# Patient Record
Sex: MALE | Race: BLACK OR AFRICAN AMERICAN | Employment: UNEMPLOYED | URBAN - METROPOLITAN AREA
[De-identification: names, ages, dates, MRNs, and addresses within clinical notes are randomized per-mention and may not be internally consistent; named-entity substitution may affect disease eponyms.]

---

## 2020-04-10 ENCOUNTER — APPOINTMENT (OUTPATIENT)
Dept: GENERAL RADIOLOGY | Age: 80
DRG: 177 | End: 2020-04-10
Payer: OTHER GOVERNMENT

## 2020-04-10 ENCOUNTER — HOSPITAL ENCOUNTER (INPATIENT)
Age: 80
LOS: 10 days | Discharge: HOME | DRG: 177 | End: 2020-04-20
Attending: STUDENT IN AN ORGANIZED HEALTH CARE EDUCATION/TRAINING PROGRAM | Admitting: INTERNAL MEDICINE
Payer: OTHER GOVERNMENT

## 2020-04-10 PROBLEM — Z20.822 SUSPECTED 2019 NOVEL CORONAVIRUS INFECTION: Status: ACTIVE | Noted: 2020-04-10

## 2020-04-10 PROBLEM — E87.1 HYPONATREMIA: Status: ACTIVE | Noted: 2020-04-10

## 2020-04-10 PROBLEM — J18.9 MULTIFOCAL PNEUMONIA: Status: ACTIVE | Noted: 2020-04-10

## 2020-04-10 PROBLEM — Z95.0 PACEMAKER: Status: ACTIVE | Noted: 2020-04-10

## 2020-04-10 LAB
A/G RATIO: 1 (ref 1.1–2.2)
ALBUMIN SERPL-MCNC: 3.9 G/DL (ref 3.4–5)
ALP BLD-CCNC: 46 U/L (ref 40–129)
ALT SERPL-CCNC: 17 U/L (ref 10–40)
ANION GAP SERPL CALCULATED.3IONS-SCNC: 14 MMOL/L (ref 3–16)
AST SERPL-CCNC: 33 U/L (ref 15–37)
BASOPHILS ABSOLUTE: 0 K/UL (ref 0–0.2)
BASOPHILS RELATIVE PERCENT: 0.4 %
BILIRUB SERPL-MCNC: 0.4 MG/DL (ref 0–1)
BUN BLDV-MCNC: 17 MG/DL (ref 7–20)
C-REACTIVE PROTEIN: 91.9 MG/L (ref 0–5.1)
CALCIUM SERPL-MCNC: 8.8 MG/DL (ref 8.3–10.6)
CHLORIDE BLD-SCNC: 93 MMOL/L (ref 99–110)
CO2: 23 MMOL/L (ref 21–32)
CREAT SERPL-MCNC: 0.9 MG/DL (ref 0.8–1.3)
D DIMER: 427 NG/ML DDU (ref 0–229)
EOSINOPHILS ABSOLUTE: 0 K/UL (ref 0–0.6)
EOSINOPHILS RELATIVE PERCENT: 0 %
GFR AFRICAN AMERICAN: >60
GFR NON-AFRICAN AMERICAN: >60
GLOBULIN: 4.1 G/DL
GLUCOSE BLD-MCNC: 126 MG/DL (ref 70–99)
HCT VFR BLD CALC: 36 % (ref 40.5–52.5)
HEMOGLOBIN: 12.2 G/DL (ref 13.5–17.5)
INR BLD: 1.24 (ref 0.86–1.14)
LACTATE DEHYDROGENASE: 308 U/L (ref 100–190)
LACTIC ACID, SEPSIS: 1.6 MMOL/L (ref 0.4–1.9)
LYMPHOCYTES ABSOLUTE: 0.7 K/UL (ref 1–5.1)
LYMPHOCYTES RELATIVE PERCENT: 14.9 %
MCH RBC QN AUTO: 31.2 PG (ref 26–34)
MCHC RBC AUTO-ENTMCNC: 33.9 G/DL (ref 31–36)
MCV RBC AUTO: 92 FL (ref 80–100)
MONOCYTES ABSOLUTE: 0.2 K/UL (ref 0–1.3)
MONOCYTES RELATIVE PERCENT: 5.1 %
NEUTROPHILS ABSOLUTE: 3.6 K/UL (ref 1.7–7.7)
NEUTROPHILS RELATIVE PERCENT: 79.6 %
PDW BLD-RTO: 14.9 % (ref 12.4–15.4)
PLATELET # BLD: 141 K/UL (ref 135–450)
PMV BLD AUTO: 8.5 FL (ref 5–10.5)
POTASSIUM REFLEX MAGNESIUM: 4.3 MMOL/L (ref 3.5–5.1)
PRO-BNP: 328 PG/ML (ref 0–449)
PROCALCITONIN: 0.27 NG/ML (ref 0–0.15)
PROTHROMBIN TIME: 14.4 SEC (ref 10–13.2)
RAPID INFLUENZA  B AGN: NEGATIVE
RAPID INFLUENZA A AGN: NEGATIVE
RBC # BLD: 3.91 M/UL (ref 4.2–5.9)
SARS-COV-2, PCR: DETECTED
SODIUM BLD-SCNC: 130 MMOL/L (ref 136–145)
TOTAL PROTEIN: 8 G/DL (ref 6.4–8.2)
TROPONIN: <0.01 NG/ML
WBC # BLD: 4.5 K/UL (ref 4–11)

## 2020-04-10 PROCEDURE — 87804 INFLUENZA ASSAY W/OPTIC: CPT

## 2020-04-10 PROCEDURE — 83615 LACTATE (LD) (LDH) ENZYME: CPT

## 2020-04-10 PROCEDURE — 6370000000 HC RX 637 (ALT 250 FOR IP): Performed by: NURSE PRACTITIONER

## 2020-04-10 PROCEDURE — 87040 BLOOD CULTURE FOR BACTERIA: CPT

## 2020-04-10 PROCEDURE — 6370000000 HC RX 637 (ALT 250 FOR IP): Performed by: INTERNAL MEDICINE

## 2020-04-10 PROCEDURE — 93005 ELECTROCARDIOGRAM TRACING: CPT | Performed by: NURSE PRACTITIONER

## 2020-04-10 PROCEDURE — 84484 ASSAY OF TROPONIN QUANT: CPT

## 2020-04-10 PROCEDURE — 2580000003 HC RX 258: Performed by: INTERNAL MEDICINE

## 2020-04-10 PROCEDURE — 83880 ASSAY OF NATRIURETIC PEPTIDE: CPT

## 2020-04-10 PROCEDURE — 83605 ASSAY OF LACTIC ACID: CPT

## 2020-04-10 PROCEDURE — 99285 EMERGENCY DEPT VISIT HI MDM: CPT

## 2020-04-10 PROCEDURE — 80053 COMPREHEN METABOLIC PANEL: CPT

## 2020-04-10 PROCEDURE — 85610 PROTHROMBIN TIME: CPT

## 2020-04-10 PROCEDURE — 86140 C-REACTIVE PROTEIN: CPT

## 2020-04-10 PROCEDURE — 85025 COMPLETE CBC W/AUTO DIFF WBC: CPT

## 2020-04-10 PROCEDURE — 71045 X-RAY EXAM CHEST 1 VIEW: CPT

## 2020-04-10 PROCEDURE — 85379 FIBRIN DEGRADATION QUANT: CPT

## 2020-04-10 PROCEDURE — 84145 PROCALCITONIN (PCT): CPT

## 2020-04-10 PROCEDURE — 2580000003 HC RX 258: Performed by: NURSE PRACTITIONER

## 2020-04-10 PROCEDURE — 36415 COLL VENOUS BLD VENIPUNCTURE: CPT

## 2020-04-10 PROCEDURE — U0002 COVID-19 LAB TEST NON-CDC: HCPCS

## 2020-04-10 PROCEDURE — 2060000000 HC ICU INTERMEDIATE R&B

## 2020-04-10 RX ORDER — TAMSULOSIN HYDROCHLORIDE 0.4 MG/1
0.4 CAPSULE ORAL DAILY
COMMUNITY

## 2020-04-10 RX ORDER — SODIUM CHLORIDE 0.9 % (FLUSH) 0.9 %
10 SYRINGE (ML) INJECTION PRN
Status: DISCONTINUED | OUTPATIENT
Start: 2020-04-10 | End: 2020-04-14 | Stop reason: SDUPTHER

## 2020-04-10 RX ORDER — SODIUM CHLORIDE 0.9 % (FLUSH) 0.9 %
10 SYRINGE (ML) INJECTION EVERY 12 HOURS SCHEDULED
Status: DISCONTINUED | OUTPATIENT
Start: 2020-04-10 | End: 2020-04-14 | Stop reason: SDUPTHER

## 2020-04-10 RX ORDER — AZITHROMYCIN 250 MG/1
250 TABLET, FILM COATED ORAL SEE ADMIN INSTRUCTIONS
Status: ON HOLD | COMMUNITY
End: 2020-04-17 | Stop reason: HOSPADM

## 2020-04-10 RX ORDER — POLYVINYL ALCOHOL 14 MG/ML
1 SOLUTION/ DROPS OPHTHALMIC PRN
Status: DISCONTINUED | OUTPATIENT
Start: 2020-04-10 | End: 2020-04-20 | Stop reason: HOSPADM

## 2020-04-10 RX ORDER — AMLODIPINE BESYLATE 10 MG/1
10 TABLET ORAL DAILY
Status: DISCONTINUED | OUTPATIENT
Start: 2020-04-11 | End: 2020-04-12

## 2020-04-10 RX ORDER — AMOXICILLIN 500 MG/1
1000 CAPSULE ORAL 3 TIMES DAILY
Status: ON HOLD | COMMUNITY
Start: 2020-04-09 | End: 2020-04-17 | Stop reason: HOSPADM

## 2020-04-10 RX ORDER — METOPROLOL SUCCINATE 50 MG/1
50 TABLET, EXTENDED RELEASE ORAL DAILY
Status: DISCONTINUED | OUTPATIENT
Start: 2020-04-11 | End: 2020-04-20 | Stop reason: HOSPADM

## 2020-04-10 RX ORDER — METOPROLOL SUCCINATE 50 MG/1
50 TABLET, EXTENDED RELEASE ORAL DAILY
COMMUNITY

## 2020-04-10 RX ORDER — OLOPATADINE HYDROCHLORIDE 1 MG/ML
1 SOLUTION/ DROPS OPHTHALMIC 2 TIMES DAILY PRN
COMMUNITY

## 2020-04-10 RX ORDER — IBUPROFEN 600 MG/1
600 TABLET ORAL EVERY 6 HOURS PRN
COMMUNITY

## 2020-04-10 RX ORDER — POLYETHYLENE GLYCOL 3350 17 G/17G
17 POWDER, FOR SOLUTION ORAL DAILY PRN
Status: DISCONTINUED | OUTPATIENT
Start: 2020-04-10 | End: 2020-04-20 | Stop reason: HOSPADM

## 2020-04-10 RX ORDER — AMLODIPINE BESYLATE 10 MG/1
10 TABLET ORAL DAILY
COMMUNITY

## 2020-04-10 RX ORDER — LORATADINE 10 MG/1
10 TABLET ORAL DAILY
COMMUNITY

## 2020-04-10 RX ORDER — PROMETHAZINE HYDROCHLORIDE 25 MG/1
12.5 TABLET ORAL EVERY 6 HOURS PRN
Status: DISCONTINUED | OUTPATIENT
Start: 2020-04-10 | End: 2020-04-20 | Stop reason: HOSPADM

## 2020-04-10 RX ORDER — ONDANSETRON 2 MG/ML
4 INJECTION INTRAMUSCULAR; INTRAVENOUS EVERY 6 HOURS PRN
Status: DISCONTINUED | OUTPATIENT
Start: 2020-04-10 | End: 2020-04-20 | Stop reason: HOSPADM

## 2020-04-10 RX ORDER — KETOTIFEN FUMARATE 0.35 MG/ML
1 SOLUTION/ DROPS OPHTHALMIC 2 TIMES DAILY PRN
Status: DISCONTINUED | OUTPATIENT
Start: 2020-04-10 | End: 2020-04-20 | Stop reason: HOSPADM

## 2020-04-10 RX ORDER — HYDROXYCHLOROQUINE SULFATE 200 MG/1
400 TABLET, FILM COATED ORAL EVERY 12 HOURS
Status: COMPLETED | OUTPATIENT
Start: 2020-04-10 | End: 2020-04-11

## 2020-04-10 RX ORDER — HYDROXYCHLOROQUINE SULFATE 200 MG/1
200 TABLET, FILM COATED ORAL EVERY 12 HOURS
Status: COMPLETED | OUTPATIENT
Start: 2020-04-11 | End: 2020-04-15

## 2020-04-10 RX ORDER — ACETAMINOPHEN 650 MG/1
650 SUPPOSITORY RECTAL EVERY 6 HOURS PRN
Status: DISCONTINUED | OUTPATIENT
Start: 2020-04-10 | End: 2020-04-20 | Stop reason: HOSPADM

## 2020-04-10 RX ORDER — AZITHROMYCIN 500 MG/1
250 TABLET, FILM COATED ORAL DAILY
Status: COMPLETED | OUTPATIENT
Start: 2020-04-11 | End: 2020-04-14

## 2020-04-10 RX ORDER — ACETAMINOPHEN 325 MG/1
650 TABLET ORAL EVERY 6 HOURS PRN
Status: DISCONTINUED | OUTPATIENT
Start: 2020-04-10 | End: 2020-04-10 | Stop reason: SDUPTHER

## 2020-04-10 RX ORDER — ACETAMINOPHEN 650 MG/1
650 SUPPOSITORY RECTAL EVERY 6 HOURS PRN
Status: DISCONTINUED | OUTPATIENT
Start: 2020-04-10 | End: 2020-04-10 | Stop reason: SDUPTHER

## 2020-04-10 RX ORDER — POLYVINYL ALCOHOL 14 MG/ML
1 SOLUTION/ DROPS OPHTHALMIC PRN
COMMUNITY

## 2020-04-10 RX ORDER — TAMSULOSIN HYDROCHLORIDE 0.4 MG/1
0.4 CAPSULE ORAL DAILY
Status: DISCONTINUED | OUTPATIENT
Start: 2020-04-11 | End: 2020-04-20 | Stop reason: HOSPADM

## 2020-04-10 RX ORDER — 0.9 % SODIUM CHLORIDE 0.9 %
1000 INTRAVENOUS SOLUTION INTRAVENOUS ONCE
Status: COMPLETED | OUTPATIENT
Start: 2020-04-10 | End: 2020-04-10

## 2020-04-10 RX ORDER — AZITHROMYCIN 500 MG/1
500 TABLET, FILM COATED ORAL DAILY
Status: ACTIVE | OUTPATIENT
Start: 2020-04-10 | End: 2020-04-11

## 2020-04-10 RX ORDER — ACETAMINOPHEN 325 MG/1
650 TABLET ORAL EVERY 6 HOURS PRN
Status: DISCONTINUED | OUTPATIENT
Start: 2020-04-10 | End: 2020-04-20 | Stop reason: HOSPADM

## 2020-04-10 RX ADMIN — SODIUM CHLORIDE 1000 ML: 9 INJECTION, SOLUTION INTRAVENOUS at 17:51

## 2020-04-10 RX ADMIN — ACETAMINOPHEN 650 MG: 325 TABLET ORAL at 23:51

## 2020-04-10 RX ADMIN — HYDROXYCHLOROQUINE SULFATE 400 MG: 200 TABLET ORAL at 20:09

## 2020-04-10 RX ADMIN — SODIUM CHLORIDE, PRESERVATIVE FREE 10 ML: 5 INJECTION INTRAVENOUS at 22:20

## 2020-04-10 RX ADMIN — ACETAMINOPHEN 650 MG: 325 TABLET, FILM COATED ORAL at 17:51

## 2020-04-10 ASSESSMENT — PAIN SCALES - GENERAL
PAINLEVEL_OUTOF10: 0
PAINLEVEL_OUTOF10: 3

## 2020-04-10 ASSESSMENT — ENCOUNTER SYMPTOMS
GASTROINTESTINAL NEGATIVE: 1
SHORTNESS OF BREATH: 1
DIARRHEA: 0
VOMITING: 0
COUGH: 1
NAUSEA: 0
SORE THROAT: 0
ABDOMINAL PAIN: 0

## 2020-04-10 NOTE — ED PROVIDER NOTES
MidLevel Attestation   I havepersonally performed and/or participated in the history, exam and medical decision making and agree with all pertinent clinical information. I have also reviewed and agree with the past medical, family and social historyunless otherwise noted. I have personally performed a face to face diagnostic evaluation onthis patient. I have reviewed the mid-levels findings and agree. In brief, Maranda Sanz is a 78 y.o. male that presented to the emergency department with upper respiratory symptoms. Exam ill looking but nontoxic. With a T-max of 101.8. EKG by my preliminary interpretation shows sinus rhythm with rate of 80, normal axis, normal intervals, with no ST changes indicative of ischemia at this time.     I have reviewed and interpreted all of the currently available lab results and diagnostics from this visit:  Results for orders placed or performed during the hospital encounter of 04/10/20   Rapid influenza A/B antigens   Result Value Ref Range    Rapid Influenza A Ag Negative Negative    Rapid Influenza B Ag Negative Negative   CBC auto differential   Result Value Ref Range    WBC 4.5 4.0 - 11.0 K/uL    RBC 3.91 (L) 4.20 - 5.90 M/uL    Hemoglobin 12.2 (L) 13.5 - 17.5 g/dL    Hematocrit 36.0 (L) 40.5 - 52.5 %    MCV 92.0 80.0 - 100.0 fL    MCH 31.2 26.0 - 34.0 pg    MCHC 33.9 31.0 - 36.0 g/dL    RDW 14.9 12.4 - 15.4 %    Platelets 831 323 - 493 K/uL    MPV 8.5 5.0 - 10.5 fL    Neutrophils % 79.6 %    Lymphocytes % 14.9 %    Monocytes % 5.1 %    Eosinophils % 0.0 %    Basophils % 0.4 %    Neutrophils Absolute 3.6 1.7 - 7.7 K/uL    Lymphocytes Absolute 0.7 (L) 1.0 - 5.1 K/uL    Monocytes Absolute 0.2 0.0 - 1.3 K/uL    Eosinophils Absolute 0.0 0.0 - 0.6 K/uL    Basophils Absolute 0.0 0.0 - 0.2 K/uL   Comprehensive Metabolic Panel w/ Reflex to MG   Result Value Ref Range    Sodium 130 (L) 136 - 145 mmol/L    Potassium reflex Magnesium 4.3 3.5 - 5.1 mmol/L    Chloride 93 (L) 99 These are concerning for multifocal infectious/inflammatory infiltrates with atypical/viral pneumonia in the differential.       ED Medication Orders (From admission, onward)    Start Ordered     Status Ordering Provider    04/11/20 1850 04/10/20 1851  hydroxychloroquine (PLAQUENIL) tablet 200 mg  EVERY 12 HOURS      Acknowledged [facility], [facility]    04/11/20 0900 04/10/20 1851  azithromycin (ZITHROMAX) tablet 250 mg  DAILY      Acknowledged [facility]    04/11/20 0900 04/10/20 2152  amLODIPine (NORVASC) tablet 10 mg  DAILY      Acknowledged [facility]    04/11/20 0900 04/10/20 2152  metoprolol succinate (TOPROL XL) extended release tablet 50 mg  DAILY      Acknowledged [facility]    04/11/20 0900 04/10/20 2152  tamsulosin (FLOMAX) capsule 0.4 mg  DAILY      Acknowledged [facility]    04/11/20 0900 04/10/20 2152  enoxaparin (LOVENOX) injection 40 mg  DAILY      Acknowledged [facility]    04/10/20 2215 04/10/20 2152  sodium chloride flush 0.9 % injection 10 mL  2 times per day      Last MAR action:  Given - by Edwar Chiu on 04/10/20 at [address], [facility]    04/10/20 2156 04/10/20 2152  ketotifen (ZADITOR) 0.025 % ophthalmic solution 1 drop  2 TIMES DAILY PRN      Acknowledged [facility]    04/10/20 2152 04/10/20 2152  polyvinyl alcohol (LIQUIFILM TEARS) 1.4 % ophthalmic solution 1 drop  PRN      Acknowledged [facility], [facility]    04/10/20 2152 04/10/20 2152  sodium chloride flush 0.9 % injection 10 mL  PRN      Acknowledged [facility], [facility]    04/10/20 2152 04/10/20 2152  acetaminophen (TYLENOL) tablet 650 mg  EVERY 6 HOURS PRN      Acknowledged [facility], [facility]    04/10/20 2152 04/10/20 2152  acetaminophen (TYLENOL) suppository 650 mg  EVERY 6 HOURS PRN      Acknowledged [facility], [facility]    04/10/20 2152 04/10/20 2152  polyethylene glycol (GLYCOLAX) packet 17 g  DAILY PRN      Acknowledged [facility]    04/10/20 2152 04/10/20 2152  promethazine (PHENERGAN) tablet 12.5 mg EVERY 6 HOURS PRN      Acknowledged [facility]    04/10/20 2152 04/10/20 2152  ondansetron (ZOFRAN) injection 4 mg  EVERY 6 HOURS PRN      Acknowledged [facility]    04/10/20 1900 04/10/20 1851  hydroxychloroquine (PLAQUENIL) tablet 400 mg  EVERY 12 HOURS      Last MAR action:  Given - by Elder Leyva on 04/10/20 at [facility]    04/10/20 1900 04/10/20 1851  azithromycin (ZITHROMAX) tablet 500 mg  DAILY      Last MAR action:  Not Given - by MEL DOAN on 04/10/20 at [address], [facility]    04/10/20 1730 04/10/20 1715  0.9 % sodium chloride bolus  ONCE      Last MAR action:  Stopped - by Kathy Flores on 04/10/20 at [address] [address]        51-year-old gentleman who presented emergency room with symptoms of upper respiratory infection. On exam afebrile and ill looking. Labs with elevated procalcitonin, d-dimer low sodium. Chest x-ray concerning for patchy opacities today lungs bilaterally suspicious for COVID 19. Given these findings patient was started on septic protocol with antibiotics and admitted for further treatment. Final Impression      1. Pneumonia due to organism    2. Suspected COVID-19 virus infection    3. Fever, unspecified fever cause    4. Shortness of breath    5. Cough    6. Elevated d-dimer    7. Elevated LDH    8. Elevated C-reactive protein (CRP)    9. Elevated procalcitonin    10.  Viral illness        DISPOSITION Admitted 04/10/2020 07:27:44 PM         Amount and/or Complexity of Data Reviewed:  Clinical lab tests: ordered and reviewed   Tests in the radiology section of CPT®: ordered and reviewed   Tests in the medicine section of CPT®: ordered and reviewed   Decide to obtain previous medical records or to obtain history from someone other than the patient: no  Obtain history from someone other than the patient: no  Review and summarize past medical records:yes  I looked up the patient in our electronic medical record:yes  Discuss the patient with

## 2020-04-10 NOTE — H&P
medical problems:  - Pacemaker in place, hx of Mobitz Type II      DVT Prophylaxis: Lovenox  Diet: DIET GENERAL; Daily Fluid Restriction: 1500 ml  Code Status: Full Code    PT/OT Eval Status: once acute medical issues resolved    Dispo - Admit as inpatient. I anticipate hospitalization spanning more than two midnights for investigation and treatment of the above medically necessary diagnoses. Tez Bella MD   Hospitalist    Thank you No primary care provider on file. for the opportunity to be involved in this patient's care. If you have any questions or concerns please feel free to contact me at [phone].     SARS-COV2 +ve; ABG reviewed, On 5 L NC PO2 49, Arterial O2 86.4%  Place patient on 10 L HiFlo  RN asked to update on vitals     Tez Bella  Hospitalist  2:48 AM

## 2020-04-10 NOTE — ED PROVIDER NOTES
Phone [phone]   TROPONIN    Narrative:     Performed at:  [facility]  [address] [place] [facility] [address]   Phone [phone]   BRAIN NATRIURETIC PEPTIDE    Narrative:     Performed at:  [facility]  [address] [place] [facility] [address]   Phone [phone]   URINE RT REFLEX TO CULTURE   LACTATE, SEPSIS   COVID-19   POCT GLUCOSE       All other labs were within normal range or not returned as of this dictation. EKG: All EKG's are interpreted by the Emergency Department Physician in the absence of a cardiologist.  Please see their note for interpretation of EKG. RADIOLOGY:   Non-plain film images such as CT, Ultrasound and MRI are read by the radiologist. Plain radiographic images are visualized and preliminarily interpreted by the ED Provider with the below findings:        Interpretation per the Radiologist below, if available at the time of this note:    XR CHEST PORTABLE   Final Result   Patchy/streaky airspace opacities to the lungs bilaterally, left somewhat   more than right. These are concerning for multifocal infectious/inflammatory   infiltrates with atypical/viral pneumonia in the differential.           Xr Chest Portable    Result Date: 4/10/2020  EXAMINATION: ONE XRAY VIEW OF THE CHEST 4/10/2020 5:18 pm COMPARISON: None HISTORY: ORDERING SYSTEM PROVIDED HISTORY: cough TECHNOLOGIST PROVIDED HISTORY: Reason for exam:->cough Reason for Exam: cough Acuity: Acute Type of Exam: Initial FINDINGS: Pacer device overlies left hemithorax. Patchy/streaky airspace opacities with mid to lower lung zone predominance, left somewhat more than right. Some of the opacities are peripherally located to the left lower lung zone. No definite pleural effusions noted. Cardiac and mediastinal silhouettes are within normal limits. No pneumothoraces are seen. No acute bony abnormalities.      Patchy/streaky airspace opacities to the lungs bilaterally, left somewhat more than right. These are concerning for multifocal infectious/inflammatory infiltrates with atypical/viral pneumonia in the differential.           PROCEDURES   Unless otherwise noted below, none     Procedures    CRITICAL CARE TIME   N/A    CONSULTS:  None      EMERGENCY DEPARTMENT COURSE and DIFFERENTIAL DIAGNOSIS/MDM:   Vitals:    Vitals:    04/10/20 1704 04/10/20 1714 04/10/20 1720 04/10/20 1820   BP: (!) 144/74  (!) 149/73 (!) 142/71   Pulse: 81 80 83 76   Resp: 29 30 (!) 32 (!) 34   Temp: 101.8 °F (38.8 °C)      TempSrc: Oral      SpO2: 92% 90% 91% 93%       Patient was given the following medications:  Medications   acetaminophen (TYLENOL) tablet 650 mg (650 mg Oral Given 4/10/20 1751)     Or   acetaminophen (TYLENOL) suppository 650 mg ( Rectal See Alternative 4/10/20 1751)   hydroxychloroquine (PLAQUENIL) tablet 400 mg (has no administration in time range)     Followed by   hydroxychloroquine (PLAQUENIL) tablet 200 mg (has no administration in time range)   azithromycin (ZITHROMAX) tablet 500 mg (has no administration in time range)     Followed by   azithromycin (ZITHROMAX) tablet 250 mg (has no administration in time range)   0.9 % sodium chloride bolus (1,000 mLs Intravenous New Bag 4/10/20 1751)       MDM: Patient was seen and evaluated per myself in conjunction with ED attending Shania Mccain. See HPI and above for full presentation and physical exam.  Patient is a very pleasant 40-year-old male that presents the ED today with complaints of worsening weakness, cough and shortness of breath as well as known fevers at home. He was evaluated by [facility] yesterday, sent home with antibiotics for probable pneumonia. He states that he got worse overnight and therefore came to the ED. He is febrile, not tachycardic, normotensive but hypoxic at 90% on room air. He does not appear toxic but does appear ill. He is in no acute distress.     Differential diagnoses include medications on file              (Please note that portions of this note were completed with a voice recognition program.  Efforts were made to edit the dictations but occasionally words are mis-transcribed.)    SYED Lopez CNP (electronically signed)            SYED Lopez CNP  04/10/20 7200

## 2020-04-10 NOTE — ED NOTES
Bed: A-17  Expected date:   Expected time:   Means of arrival: [facility]  Comments:  Fever, covid exposure     Herminia Vaughn RN  04/10/20 8335

## 2020-04-11 ENCOUNTER — APPOINTMENT (OUTPATIENT)
Dept: GENERAL RADIOLOGY | Age: 80
DRG: 177 | End: 2020-04-11
Payer: OTHER GOVERNMENT

## 2020-04-11 PROBLEM — J96.01 ACUTE RESPIRATORY FAILURE WITH HYPOXEMIA (HCC): Status: ACTIVE | Noted: 2020-04-11

## 2020-04-11 LAB
ANION GAP SERPL CALCULATED.3IONS-SCNC: 10 MMOL/L (ref 3–16)
BASE EXCESS ARTERIAL: -0.7 MMOL/L (ref -3–3)
BASE EXCESS ARTERIAL: -1.7 MMOL/L (ref -3–3)
BASE EXCESS ARTERIAL: 0 MMOL/L (ref -3–3)
BASOPHILS ABSOLUTE: 0 K/UL (ref 0–0.2)
BASOPHILS RELATIVE PERCENT: 0.3 %
BUN BLDV-MCNC: 12 MG/DL (ref 7–20)
CALCIUM SERPL-MCNC: 8.2 MG/DL (ref 8.3–10.6)
CARBOXYHEMOGLOBIN ARTERIAL: 0.5 % (ref 0–1.5)
CARBOXYHEMOGLOBIN ARTERIAL: 0.7 % (ref 0–1.5)
CARBOXYHEMOGLOBIN ARTERIAL: 0.7 % (ref 0–1.5)
CHLORIDE BLD-SCNC: 99 MMOL/L (ref 99–110)
CO2: 25 MMOL/L (ref 21–32)
CREAT SERPL-MCNC: 1 MG/DL (ref 0.8–1.3)
EKG ATRIAL RATE: 80 BPM
EKG DIAGNOSIS: NORMAL
EKG P AXIS: 28 DEGREES
EKG P-R INTERVAL: 230 MS
EKG Q-T INTERVAL: 370 MS
EKG QRS DURATION: 78 MS
EKG QTC CALCULATION (BAZETT): 426 MS
EKG R AXIS: -40 DEGREES
EKG T AXIS: -3 DEGREES
EKG VENTRICULAR RATE: 80 BPM
EOSINOPHILS ABSOLUTE: 0 K/UL (ref 0–0.6)
EOSINOPHILS RELATIVE PERCENT: 0 %
FERRITIN: 724.2 NG/ML (ref 30–400)
GFR AFRICAN AMERICAN: >60
GFR NON-AFRICAN AMERICAN: >60
GLUCOSE BLD-MCNC: 120 MG/DL (ref 70–99)
HCO3 ARTERIAL: 20.9 MMOL/L (ref 21–29)
HCO3 ARTERIAL: 22.8 MMOL/L (ref 21–29)
HCO3 ARTERIAL: 23.1 MMOL/L (ref 21–29)
HCT VFR BLD CALC: 37.5 % (ref 40.5–52.5)
HEMOGLOBIN, ART, EXTENDED: 11.5 G/DL (ref 13.5–17.5)
HEMOGLOBIN, ART, EXTENDED: 12.1 G/DL (ref 13.5–17.5)
HEMOGLOBIN, ART, EXTENDED: 12.5 G/DL (ref 13.5–17.5)
HEMOGLOBIN: 12.4 G/DL (ref 13.5–17.5)
LYMPHOCYTES ABSOLUTE: 0.8 K/UL (ref 1–5.1)
LYMPHOCYTES RELATIVE PERCENT: 19.7 %
MCH RBC QN AUTO: 30.9 PG (ref 26–34)
MCHC RBC AUTO-ENTMCNC: 33 G/DL (ref 31–36)
MCV RBC AUTO: 93.6 FL (ref 80–100)
METHEMOGLOBIN ARTERIAL: 0.1 %
METHEMOGLOBIN ARTERIAL: 0.3 %
METHEMOGLOBIN ARTERIAL: 0.4 %
MONOCYTES ABSOLUTE: 0.2 K/UL (ref 0–1.3)
MONOCYTES RELATIVE PERCENT: 4.4 %
NEUTROPHILS ABSOLUTE: 3.1 K/UL (ref 1.7–7.7)
NEUTROPHILS RELATIVE PERCENT: 75.6 %
O2 CONTENT ARTERIAL: 12 ML/DL
O2 CONTENT ARTERIAL: 14 ML/DL
O2 CONTENT ARTERIAL: 16 ML/DL
O2 SAT, ARTERIAL: 73 %
O2 SAT, ARTERIAL: 86.4 %
O2 SAT, ARTERIAL: 89.7 %
O2 THERAPY: ABNORMAL
PCO2 ARTERIAL: 28.4 MMHG (ref 35–45)
PCO2 ARTERIAL: 31.4 MMHG (ref 35–45)
PCO2 ARTERIAL: 33.2 MMHG (ref 35–45)
PDW BLD-RTO: 15.2 % (ref 12.4–15.4)
PH ARTERIAL: 7.45 (ref 7.35–7.45)
PH ARTERIAL: 7.47 (ref 7.35–7.45)
PH ARTERIAL: 7.47 (ref 7.35–7.45)
PLATELET # BLD: 145 K/UL (ref 135–450)
PMV BLD AUTO: 8.1 FL (ref 5–10.5)
PO2 ARTERIAL: 38 MMHG (ref 75–108)
PO2 ARTERIAL: 49 MMHG (ref 75–108)
PO2 ARTERIAL: 54.4 MMHG (ref 75–108)
POTASSIUM REFLEX MAGNESIUM: 4.3 MMOL/L (ref 3.5–5.1)
RBC # BLD: 4 M/UL (ref 4.2–5.9)
SODIUM BLD-SCNC: 134 MMOL/L (ref 136–145)
TCO2 ARTERIAL: 21.7 MMOL/L
TCO2 ARTERIAL: 23.8 MMOL/L
TCO2 ARTERIAL: 24 MMOL/L
WBC # BLD: 4 K/UL (ref 4–11)

## 2020-04-11 PROCEDURE — 6370000000 HC RX 637 (ALT 250 FOR IP): Performed by: INTERNAL MEDICINE

## 2020-04-11 PROCEDURE — 80048 BASIC METABOLIC PNL TOTAL CA: CPT

## 2020-04-11 PROCEDURE — 94761 N-INVAS EAR/PLS OXIMETRY MLT: CPT

## 2020-04-11 PROCEDURE — 2700000000 HC OXYGEN THERAPY PER DAY

## 2020-04-11 PROCEDURE — 82803 BLOOD GASES ANY COMBINATION: CPT

## 2020-04-11 PROCEDURE — 2580000003 HC RX 258: Performed by: INTERNAL MEDICINE

## 2020-04-11 PROCEDURE — 36600 WITHDRAWAL OF ARTERIAL BLOOD: CPT

## 2020-04-11 PROCEDURE — 71045 X-RAY EXAM CHEST 1 VIEW: CPT

## 2020-04-11 PROCEDURE — 93010 ELECTROCARDIOGRAM REPORT: CPT | Performed by: INTERNAL MEDICINE

## 2020-04-11 PROCEDURE — 6360000002 HC RX W HCPCS: Performed by: INTERNAL MEDICINE

## 2020-04-11 PROCEDURE — 82728 ASSAY OF FERRITIN: CPT

## 2020-04-11 PROCEDURE — 2060000000 HC ICU INTERMEDIATE R&B

## 2020-04-11 PROCEDURE — 85025 COMPLETE CBC W/AUTO DIFF WBC: CPT

## 2020-04-11 PROCEDURE — 93005 ELECTROCARDIOGRAM TRACING: CPT | Performed by: INTERNAL MEDICINE

## 2020-04-11 RX ADMIN — ACETAMINOPHEN 650 MG: 325 TABLET ORAL at 21:14

## 2020-04-11 RX ADMIN — AZITHROMYCIN MONOHYDRATE 250 MG: 500 TABLET ORAL at 09:14

## 2020-04-11 RX ADMIN — ACETAMINOPHEN 650 MG: 325 TABLET ORAL at 15:14

## 2020-04-11 RX ADMIN — AMLODIPINE BESYLATE 10 MG: 10 TABLET ORAL at 09:09

## 2020-04-11 RX ADMIN — SODIUM CHLORIDE, PRESERVATIVE FREE 10 ML: 5 INJECTION INTRAVENOUS at 09:10

## 2020-04-11 RX ADMIN — HYDROXYCHLOROQUINE SULFATE 400 MG: 200 TABLET ORAL at 06:44

## 2020-04-11 RX ADMIN — METOPROLOL SUCCINATE 50 MG: 50 TABLET, EXTENDED RELEASE ORAL at 09:09

## 2020-04-11 RX ADMIN — SODIUM CHLORIDE, PRESERVATIVE FREE 10 ML: 5 INJECTION INTRAVENOUS at 20:56

## 2020-04-11 RX ADMIN — ENOXAPARIN SODIUM 40 MG: 40 INJECTION SUBCUTANEOUS at 09:09

## 2020-04-11 RX ADMIN — TAMSULOSIN HYDROCHLORIDE 0.4 MG: 0.4 CAPSULE ORAL at 09:09

## 2020-04-11 RX ADMIN — HYDROXYCHLOROQUINE SULFATE 200 MG: 200 TABLET ORAL at 17:51

## 2020-04-11 ASSESSMENT — PAIN SCALES - GENERAL
PAINLEVEL_OUTOF10: 0

## 2020-04-11 NOTE — PROGRESS NOTES
SARS-COV2 +ve; ABG reviewed, On 5 L NC PO2 49, Arterial O2 86.4%  Place patient on 10 L HiFlo  RN asked to update on [place]  2:48 AM

## 2020-04-11 NOTE — PROGRESS NOTES
Pt resting in bed eyes closed. Awakened easily to name. Pt can be difficult to communicate with due to English not being his primary language. Pt states that he does not want lunch. Nurse then assisted pt to the bathroom. Gait is slightly unsteady, pt is weak. No different from this morning. Pt very focused on brushing his teeth and \"removing the germs from my mouth\". Pt back in bed. Call light within reach. Pt's family brought in his cell phone and clothes and a Gnosticism book,  and tooth brush and tooth paste. Bed alarm on.

## 2020-04-11 NOTE — ED NOTES
ED SBAR report provider to [facility], RN. Patient to be transported to Room 5269 via stretcher by ED Tech. IV site clean, dry, and intact. MEWS score and pain assessed and documented. Updated Patient  on plan of care. Was in room to give patient his medications as well. Donned all appropriate PPE. RN Chart notification of presence in patient room.      Conchita Vang RN  04/10/20 7345

## 2020-04-11 NOTE — ED NOTES
Bedside report received from [address] [place] [address].       Rivas Dang RN  04/10/20 2007

## 2020-04-11 NOTE — PROGRESS NOTES
Patient put on 10 L HiFlo as ordered. Sent message via [employer] to Dr. Rony Lazo at [facility]: T: 98.8, HR 82, /72. His breathing is still fast (30). [facility] Still A/O. Will continue to monitor patient.

## 2020-04-11 NOTE — PROGRESS NOTES
* 134*   K 4.3 4.3   CL 93* 99   CO2 23 25   BUN 17 12   CREATININE 0.9 1.0   GLUCOSE 126* 120*     POC GLUCOSE:  No results for input(s): POCGLU in the last 72 hours. LIVER PROFILE:   Recent Labs     04/10/20  1731   AST 33   ALT 17   LABALBU 3.9   BILITOT 0.4   ALKPHOS 46     PT/INR:   Recent Labs     04/10/20  1732   PROTIME 14.4*   INR 1.24*     APTT: No results for input(s): APTT in the last 72 hours. UA:No results for input(s): NITRITE, COLORU, PHUR, LABCAST, WBCUA, RBCUA, MUCUS, TRICHOMONAS, YEAST, BACTERIA, CLARITYU, SPECGRAV, LEUKOCYTESUR, UROBILINOGEN, BILIRUBINUR, BLOODU, GLUCOSEU, KETUA, AMORPHOUS in the last 72 hours. Microbiology:  Wound Culture: No results for input(s): WNDABS, ORG in the last 72 hours. Invalid input(s):  LABGRAM  Nasal Culture: No results for input(s): ORG, MRSAPCR in the last 72 hours. Blood Culture: No results for input(s): BC, BLOODCULT2 in the last 72 hours. Fungal Culture:   No results for input(s): FUNGSM in the last 72 hours. No results for input(s): FUNCXBLD in the last 72 hours. CSF Culture:  No results for input(s): COLORCSF, APPEARCSF, CFTUBE, CLOTCSF, WBCCSF, RBCCSF, NEUTCSF, NUMCELLSCSF, LYMPHSCSF, MONOCSF, GLUCCSF, VOLCSF in the last 72 hours. Respiratory Culture:  No results for input(s): Vivica Jaime in the last 72 hours. AFB:No results for input(s): AFBSMEAR in the last 72 hours. Urine Culture  No results for input(s): LABURIN in the last 72 hours. RADIOLOGY:    XR CHEST PORTABLE   Preliminary Result   Patchy multifocal airspace opacities are noted bilaterally without   significant change. Atypical/viral pneumonia is included in the differential.         XR CHEST PORTABLE   Final Result   Patchy/streaky airspace opacities to the lungs bilaterally, left somewhat   more than right.   These are concerning for multifocal infectious/inflammatory   infiltrates with atypical/viral pneumonia in the differential. 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 *    Multifocal pneumonia due to COVID 19 infection  -D-dimer LDH CRP elevated patient has lymphopenia  -Continue hydroxy chloroquine with azithromycin  -Monitor QTC  -Continue droplet isolation    Acute hypoxic respiratory failure  -Now needing 8 L of oxygen. Will monitor. If condition deteriorates will need to transfer to the ICU    Hypertension  -Monitor blood pressure. Continue home medications    Hyponatremia-improved   -Monitor    Diarrhea  -sec to COVID      DVT Prophylaxis: lovenox  Diet: DIET GENERAL; Daily Fluid Restriction: 1500 ml  Code Status: Full Code    PT/OT Eval Status:    Discharge plan - ct care    The patient and / or the family were informed of the results of any tests, a time was given to answer questions, a plan was proposed and they agreed with plan. Discussed with consulting physicians, nursing and social work     The note was completed using EMR. Every effort was made to ensure accuracy; however, inadvertent computerized transcription errors may be present.        Danilo Appiah MD

## 2020-04-11 NOTE — PROGRESS NOTES
Pt resting quietly in bed. Respiratory rate is 28 but pt does not appear to be in distress. Denies pain, no outward signs of pain noted. Pt does not verbalize much. Pt reminded to call for assistance prior to getting out of bed. Call light within reach. Bed alarm is on. Will monitor.

## 2020-04-11 NOTE — PROGRESS NOTES
Pt's temperature down to 100.8 orally. Pt is resting comfortably in bed. Pt's oxygen level staying >95% oxygen turned down to 7L high flow nasal canula.

## 2020-04-12 LAB
ANION GAP SERPL CALCULATED.3IONS-SCNC: 15 MMOL/L (ref 3–16)
BUN BLDV-MCNC: 19 MG/DL (ref 7–20)
C DIFF TOXIN/ANTIGEN: NORMAL
CALCIUM SERPL-MCNC: 8.4 MG/DL (ref 8.3–10.6)
CHLORIDE BLD-SCNC: 98 MMOL/L (ref 99–110)
CO2: 22 MMOL/L (ref 21–32)
CREAT SERPL-MCNC: 1.2 MG/DL (ref 0.8–1.3)
D DIMER: 657 NG/ML DDU (ref 0–229)
FERRITIN: 837.4 NG/ML (ref 30–400)
GFR AFRICAN AMERICAN: >60
GFR NON-AFRICAN AMERICAN: 58
GLUCOSE BLD-MCNC: 166 MG/DL (ref 70–99)
POTASSIUM SERPL-SCNC: 4.2 MMOL/L (ref 3.5–5.1)
SODIUM BLD-SCNC: 135 MMOL/L (ref 136–145)

## 2020-04-12 PROCEDURE — 6370000000 HC RX 637 (ALT 250 FOR IP): Performed by: INTERNAL MEDICINE

## 2020-04-12 PROCEDURE — 82728 ASSAY OF FERRITIN: CPT

## 2020-04-12 PROCEDURE — 85379 FIBRIN DEGRADATION QUANT: CPT

## 2020-04-12 PROCEDURE — 6360000002 HC RX W HCPCS: Performed by: INTERNAL MEDICINE

## 2020-04-12 PROCEDURE — 87324 CLOSTRIDIUM AG IA: CPT

## 2020-04-12 PROCEDURE — 2060000000 HC ICU INTERMEDIATE R&B

## 2020-04-12 PROCEDURE — 80048 BASIC METABOLIC PNL TOTAL CA: CPT

## 2020-04-12 PROCEDURE — 2580000003 HC RX 258: Performed by: INTERNAL MEDICINE

## 2020-04-12 PROCEDURE — 87449 NOS EACH ORGANISM AG IA: CPT

## 2020-04-12 PROCEDURE — 93005 ELECTROCARDIOGRAM TRACING: CPT | Performed by: INTERNAL MEDICINE

## 2020-04-12 RX ORDER — AMLODIPINE BESYLATE 5 MG/1
2.5 TABLET ORAL DAILY
Status: DISCONTINUED | OUTPATIENT
Start: 2020-04-13 | End: 2020-04-20 | Stop reason: HOSPADM

## 2020-04-12 RX ADMIN — ENOXAPARIN SODIUM 40 MG: 40 INJECTION SUBCUTANEOUS at 09:29

## 2020-04-12 RX ADMIN — HYDROXYCHLOROQUINE SULFATE 200 MG: 200 TABLET ORAL at 06:43

## 2020-04-12 RX ADMIN — AZITHROMYCIN MONOHYDRATE 250 MG: 500 TABLET ORAL at 09:29

## 2020-04-12 RX ADMIN — METOPROLOL SUCCINATE 50 MG: 50 TABLET, EXTENDED RELEASE ORAL at 09:29

## 2020-04-12 RX ADMIN — AMLODIPINE BESYLATE 10 MG: 10 TABLET ORAL at 09:29

## 2020-04-12 RX ADMIN — ACETAMINOPHEN 650 MG: 325 TABLET ORAL at 13:45

## 2020-04-12 RX ADMIN — SODIUM CHLORIDE, PRESERVATIVE FREE 10 ML: 5 INJECTION INTRAVENOUS at 21:07

## 2020-04-12 RX ADMIN — TAMSULOSIN HYDROCHLORIDE 0.4 MG: 0.4 CAPSULE ORAL at 09:29

## 2020-04-12 RX ADMIN — SODIUM CHLORIDE, PRESERVATIVE FREE 10 ML: 5 INJECTION INTRAVENOUS at 09:30

## 2020-04-12 RX ADMIN — ACETAMINOPHEN 650 MG: 325 TABLET ORAL at 23:35

## 2020-04-12 RX ADMIN — HYDROXYCHLOROQUINE SULFATE 200 MG: 200 TABLET ORAL at 17:56

## 2020-04-12 ASSESSMENT — PAIN SCALES - GENERAL
PAINLEVEL_OUTOF10: 0

## 2020-04-12 ASSESSMENT — PAIN SCALES - WONG BAKER
WONGBAKER_NUMERICALRESPONSE: 0
WONGBAKER_NUMERICALRESPONSE: 0

## 2020-04-12 NOTE — PROGRESS NOTES
Progress Note  Admit Date: 4/10/2020      PCP: No primary care provider on file. CC: F/U for shortness of breath    Days in hospital:  2    SUBJECTIVE / Interval History:  Patient still appears a little confused. Has some cough. Unable to tell me what is bothering him  Still spiking a temp. Allergies  Patient has no known allergies. Medications    Scheduled Meds:   hydroxychloroquine  200 mg Oral Q12H    azithromycin  250 mg Oral Daily    amLODIPine  10 mg Oral Daily    metoprolol succinate  50 mg Oral Daily    tamsulosin  0.4 mg Oral Daily    sodium chloride flush  10 mL Intravenous 2 times per day    enoxaparin  40 mg Subcutaneous Daily     Continuous Infusions:    PRN Meds:  ketotifen, polyvinyl alcohol, sodium chloride flush, acetaminophen **OR** acetaminophen, polyethylene glycol, promethazine **OR** ondansetron    Vitals    /68   Pulse 93   Temp 99.1 °F (37.3 °C) (Oral)   Resp 28   Ht 5' 11\" (1.803 m)   Wt 207 lb 0.2 oz (93.9 kg)   SpO2 93%   BMI 28.87 kg/m²     Exam:    Gen: No distress. Eyes: PERRL. No sclera icterus. No conjunctival injection. ENT: No discharge. Pharynx clear. External appearance of ears and nose normal.  Neck: Trachea midline. No obvious mass. Resp: No accessory muscle use. No crackles. No wheezes. No rhonchi. Air entry decreased  CV: Regular rate. Regular rhythm. No murmur or rub. No edema. GI: Non-tender. Non-distended. No hernia. Skin: Warm, dry, normal texture and turgor. No nodule on exposed extremities. Lymph: No cervical LAD. No supraclavicular LAD. M/S: No cyanosis. No clubbing. No joint deformity. Neuro: Moves all four extremities. CN 2-12 tested, no defect noted.   Psych: x2, communication is an issue due to language barrier    Data    LABS  CBC:   Recent Labs     04/10/20  1732 04/11/20  0627   WBC 4.5 4.0   HGB 12.2* 12.4*   HCT 36.0* 37.5*   MCV 92.0 93.6    145     BMP:   Recent Labs     04/10/20  1731

## 2020-04-12 NOTE — PROGRESS NOTES
Pt is awake alert following commands and making needs known. Pt ambulation is steadier than yesterday. Respirations not as tachypnic. Pt sitting up in chair. Call light within reach. Chair alarm on. Will monitor.

## 2020-04-12 NOTE — PROGRESS NOTES
04/11/20  0627 04/12/20  1131   * 134* 135*   K 4.3 4.3 4.2   CL 93* 99 98*   CO2 23 25 22   BUN 17 12 19   CREATININE 0.9 1.0 1.2   GLUCOSE 126* 120* 166*     POC GLUCOSE:  No results for input(s): POCGLU in the last 72 hours. LIVER PROFILE:   Recent Labs     04/10/20  1731   AST 33   ALT 17   LABALBU 3.9   BILITOT 0.4   ALKPHOS 46     PT/INR:   Recent Labs     04/10/20  1732   PROTIME 14.4*   INR 1.24*     APTT: No results for input(s): APTT in the last 72 hours. UA:No results for input(s): NITRITE, COLORU, PHUR, LABCAST, WBCUA, RBCUA, MUCUS, TRICHOMONAS, YEAST, BACTERIA, CLARITYU, SPECGRAV, LEUKOCYTESUR, UROBILINOGEN, BILIRUBINUR, BLOODU, GLUCOSEU, KETUA, AMORPHOUS in the last 72 hours. Microbiology:  Wound Culture: No results for input(s): WNDABS, ORG in the last 72 hours. Invalid input(s):  LABGRAM  Nasal Culture: No results for input(s): ORG, MRSAPCR in the last 72 hours. Blood Culture:   Recent Labs     04/10/20  1731   BC No Growth to date. Any change in status will be called. BLOODCULT2 No Growth to date. Any change in status will be called. Fungal Culture:   No results for input(s): FUNGSM in the last 72 hours. No results for input(s): FUNCXBLD in the last 72 hours. CSF Culture:  No results for input(s): COLORCSF, APPEARCSF, CFTUBE, CLOTCSF, WBCCSF, RBCCSF, NEUTCSF, NUMCELLSCSF, LYMPHSCSF, MONOCSF, GLUCCSF, VOLCSF in the last 72 hours. Respiratory Culture:  No results for input(s): Candis Melter in the last 72 hours. AFB:No results for input(s): AFBSMEAR in the last 72 hours. Urine Culture  No results for input(s): LABURIN in the last 72 hours. RADIOLOGY:    XR CHEST PORTABLE   Final Result   Patchy multifocal airspace opacities are noted bilaterally without   significant change. Atypical/viral pneumonia is included in the differential.         XR CHEST PORTABLE   Final Result   Patchy/streaky airspace opacities to the lungs bilaterally, left somewhat   more than right. These are concerning for multifocal infectious/inflammatory   infiltrates with atypical/viral pneumonia in the differential.             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 *    Multifocal pneumonia due to COVID 19 infection  -D-dimer LDH CRP elevated patient has lymphopenia  -Continue hydroxy chloroquine with azithromycin  -Monitor QTC, stable  -Continue droplet isolation    Acute hypoxic respiratory failure- wean as tolerated   -Now needing 5 L of oxygen. Hypertension- Bp lower side  -Monitor blood pressure. decrease home medications    Hyponatremia-improved   -Monitor    Diarrhea  -sec to COVID    Acute metabolic encephalopathy  -Secondary to infection. Monitor for recovery      DVT Prophylaxis: lovenox  Diet: DIET GENERAL; Daily Fluid Restriction: 1500 ml  Code Status: Full Code        Discharge plan - ct care    The patient and / or the family were informed of the results of any tests, a time was given to answer questions, a plan was proposed and they agreed with plan. Discussed with consulting physicians, nursing and social work     The note was completed using EMR. Every effort was made to ensure accuracy; however, inadvertent computerized transcription errors may be present.        Kiera Chamberlain MD

## 2020-04-13 LAB
BASE EXCESS ARTERIAL: -0.7 MMOL/L (ref -3–3)
CARBOXYHEMOGLOBIN ARTERIAL: 0.7 % (ref 0–1.5)
EKG ATRIAL RATE: 82 BPM
EKG ATRIAL RATE: 90 BPM
EKG ATRIAL RATE: 96 BPM
EKG DIAGNOSIS: NORMAL
EKG P AXIS: 10 DEGREES
EKG P AXIS: 53 DEGREES
EKG P-R INTERVAL: 200 MS
EKG P-R INTERVAL: 222 MS
EKG Q-T INTERVAL: 350 MS
EKG Q-T INTERVAL: 360 MS
EKG Q-T INTERVAL: 400 MS
EKG QRS DURATION: 76 MS
EKG QRS DURATION: 80 MS
EKG QRS DURATION: 82 MS
EKG QTC CALCULATION (BAZETT): 432 MS
EKG QTC CALCULATION (BAZETT): 440 MS
EKG QTC CALCULATION (BAZETT): 467 MS
EKG R AXIS: -37 DEGREES
EKG R AXIS: -42 DEGREES
EKG R AXIS: -42 DEGREES
EKG T AXIS: -8 DEGREES
EKG T AXIS: 11 DEGREES
EKG T AXIS: 2 DEGREES
EKG VENTRICULAR RATE: 82 BPM
EKG VENTRICULAR RATE: 90 BPM
EKG VENTRICULAR RATE: 92 BPM
HCO3 ARTERIAL: 22.5 MMOL/L (ref 21–29)
HEMOGLOBIN, ART, EXTENDED: 11.8 G/DL (ref 13.5–17.5)
METHEMOGLOBIN ARTERIAL: 0.4 %
O2 CONTENT ARTERIAL: 16 ML/DL
O2 SAT, ARTERIAL: 95.3 %
O2 THERAPY: ABNORMAL
PCO2 ARTERIAL: 31.9 MMHG (ref 35–45)
PH ARTERIAL: 7.46 (ref 7.35–7.45)
PO2 ARTERIAL: 70.6 MMHG (ref 75–108)
TCO2 ARTERIAL: 23.5 MMOL/L

## 2020-04-13 PROCEDURE — 82803 BLOOD GASES ANY COMBINATION: CPT

## 2020-04-13 PROCEDURE — 6370000000 HC RX 637 (ALT 250 FOR IP): Performed by: INTERNAL MEDICINE

## 2020-04-13 PROCEDURE — 93010 ELECTROCARDIOGRAM REPORT: CPT | Performed by: INTERNAL MEDICINE

## 2020-04-13 PROCEDURE — 93005 ELECTROCARDIOGRAM TRACING: CPT | Performed by: INTERNAL MEDICINE

## 2020-04-13 PROCEDURE — 6360000002 HC RX W HCPCS: Performed by: INTERNAL MEDICINE

## 2020-04-13 PROCEDURE — 2060000000 HC ICU INTERMEDIATE R&B

## 2020-04-13 PROCEDURE — 36600 WITHDRAWAL OF ARTERIAL BLOOD: CPT

## 2020-04-13 PROCEDURE — 2580000003 HC RX 258: Performed by: INTERNAL MEDICINE

## 2020-04-13 RX ADMIN — ACETAMINOPHEN 650 MG: 325 TABLET ORAL at 18:17

## 2020-04-13 RX ADMIN — HYDROXYCHLOROQUINE SULFATE 200 MG: 200 TABLET ORAL at 06:50

## 2020-04-13 RX ADMIN — TAMSULOSIN HYDROCHLORIDE 0.4 MG: 0.4 CAPSULE ORAL at 08:27

## 2020-04-13 RX ADMIN — SODIUM CHLORIDE, PRESERVATIVE FREE 10 ML: 5 INJECTION INTRAVENOUS at 20:38

## 2020-04-13 RX ADMIN — SODIUM CHLORIDE, PRESERVATIVE FREE 10 ML: 5 INJECTION INTRAVENOUS at 08:28

## 2020-04-13 RX ADMIN — ENOXAPARIN SODIUM 40 MG: 40 INJECTION SUBCUTANEOUS at 08:27

## 2020-04-13 RX ADMIN — AZITHROMYCIN MONOHYDRATE 250 MG: 500 TABLET ORAL at 08:28

## 2020-04-13 RX ADMIN — AMLODIPINE BESYLATE 2.5 MG: 5 TABLET ORAL at 08:27

## 2020-04-13 RX ADMIN — HYDROXYCHLOROQUINE SULFATE 200 MG: 200 TABLET ORAL at 18:10

## 2020-04-13 RX ADMIN — METOPROLOL SUCCINATE 50 MG: 50 TABLET, EXTENDED RELEASE ORAL at 08:27

## 2020-04-13 ASSESSMENT — PAIN SCALES - GENERAL
PAINLEVEL_OUTOF10: 0
PAINLEVEL_OUTOF10: 1
PAINLEVEL_OUTOF10: 0

## 2020-04-13 NOTE — PROGRESS NOTES
RN spoke daughter Vicente Rees on the phone. All questions answered. Will continue to monitor.     Electronically signed by Corey Whitaker RN on 4/13/2020 at 11:02 AM

## 2020-04-14 ENCOUNTER — APPOINTMENT (OUTPATIENT)
Dept: GENERAL RADIOLOGY | Age: 80
DRG: 177 | End: 2020-04-14
Payer: OTHER GOVERNMENT

## 2020-04-14 LAB
BLOOD CULTURE, ROUTINE: NORMAL
CULTURE, BLOOD 2: NORMAL
GLUCOSE BLD-MCNC: 111 MG/DL (ref 70–99)
GLUCOSE BLD-MCNC: 115 MG/DL (ref 70–99)
GLUCOSE BLD-MCNC: 128 MG/DL (ref 70–99)
GLUCOSE BLD-MCNC: 140 MG/DL (ref 70–99)
PERFORMED ON: ABNORMAL

## 2020-04-14 PROCEDURE — 51798 US URINE CAPACITY MEASURE: CPT

## 2020-04-14 PROCEDURE — 6370000000 HC RX 637 (ALT 250 FOR IP): Performed by: INTERNAL MEDICINE

## 2020-04-14 PROCEDURE — APPNB15 APP NON BILLABLE TIME 0-15 MINS: Performed by: NURSE PRACTITIONER

## 2020-04-14 PROCEDURE — 36569 INSJ PICC 5 YR+ W/O IMAGING: CPT

## 2020-04-14 PROCEDURE — 99223 1ST HOSP IP/OBS HIGH 75: CPT | Performed by: INTERNAL MEDICINE

## 2020-04-14 PROCEDURE — 6360000002 HC RX W HCPCS: Performed by: INTERNAL MEDICINE

## 2020-04-14 PROCEDURE — 6370000000 HC RX 637 (ALT 250 FOR IP): Performed by: NURSE PRACTITIONER

## 2020-04-14 PROCEDURE — 2060000000 HC ICU INTERMEDIATE R&B

## 2020-04-14 PROCEDURE — 2580000003 HC RX 258: Performed by: NURSE PRACTITIONER

## 2020-04-14 PROCEDURE — 76937 US GUIDE VASCULAR ACCESS: CPT

## 2020-04-14 PROCEDURE — 2700000000 HC OXYGEN THERAPY PER DAY

## 2020-04-14 PROCEDURE — 51702 INSERT TEMP BLADDER CATH: CPT

## 2020-04-14 PROCEDURE — 87449 NOS EACH ORGANISM AG IA: CPT

## 2020-04-14 PROCEDURE — 02HV33Z INSERTION OF INFUSION DEVICE INTO SUPERIOR VENA CAVA, PERCUTANEOUS APPROACH: ICD-10-PCS | Performed by: NURSE PRACTITIONER

## 2020-04-14 PROCEDURE — 94761 N-INVAS EAR/PLS OXIMETRY MLT: CPT

## 2020-04-14 PROCEDURE — 71045 X-RAY EXAM CHEST 1 VIEW: CPT

## 2020-04-14 PROCEDURE — 2580000003 HC RX 258: Performed by: INTERNAL MEDICINE

## 2020-04-14 RX ORDER — LIDOCAINE HYDROCHLORIDE 10 MG/ML
5 INJECTION, SOLUTION EPIDURAL; INFILTRATION; INTRACAUDAL; PERINEURAL ONCE
Status: DISCONTINUED | OUTPATIENT
Start: 2020-04-14 | End: 2020-04-20 | Stop reason: HOSPADM

## 2020-04-14 RX ORDER — SODIUM CHLORIDE 0.9 % (FLUSH) 0.9 %
10 SYRINGE (ML) INJECTION PRN
Status: DISCONTINUED | OUTPATIENT
Start: 2020-04-14 | End: 2020-04-20 | Stop reason: HOSPADM

## 2020-04-14 RX ORDER — SODIUM CHLORIDE 0.9 % (FLUSH) 0.9 %
10 SYRINGE (ML) INJECTION EVERY 12 HOURS SCHEDULED
Status: DISCONTINUED | OUTPATIENT
Start: 2020-04-14 | End: 2020-04-20 | Stop reason: HOSPADM

## 2020-04-14 RX ORDER — DEXTROSE MONOHYDRATE 50 MG/ML
100 INJECTION, SOLUTION INTRAVENOUS PRN
Status: DISCONTINUED | OUTPATIENT
Start: 2020-04-14 | End: 2020-04-20 | Stop reason: HOSPADM

## 2020-04-14 RX ORDER — DEXTROSE MONOHYDRATE 25 G/50ML
12.5 INJECTION, SOLUTION INTRAVENOUS PRN
Status: DISCONTINUED | OUTPATIENT
Start: 2020-04-14 | End: 2020-04-20 | Stop reason: HOSPADM

## 2020-04-14 RX ORDER — NICOTINE POLACRILEX 4 MG
15 LOZENGE BUCCAL PRN
Status: DISCONTINUED | OUTPATIENT
Start: 2020-04-14 | End: 2020-04-20 | Stop reason: HOSPADM

## 2020-04-14 RX ADMIN — HYDROXYCHLOROQUINE SULFATE 200 MG: 200 TABLET ORAL at 07:51

## 2020-04-14 RX ADMIN — TAMSULOSIN HYDROCHLORIDE 0.4 MG: 0.4 CAPSULE ORAL at 07:51

## 2020-04-14 RX ADMIN — HYDROXYCHLOROQUINE SULFATE 200 MG: 200 TABLET ORAL at 20:14

## 2020-04-14 RX ADMIN — METOPROLOL SUCCINATE 50 MG: 50 TABLET, EXTENDED RELEASE ORAL at 07:51

## 2020-04-14 RX ADMIN — AMLODIPINE BESYLATE 2.5 MG: 5 TABLET ORAL at 07:50

## 2020-04-14 RX ADMIN — ENOXAPARIN SODIUM 90 MG: 100 INJECTION SUBCUTANEOUS at 20:14

## 2020-04-14 RX ADMIN — SODIUM CHLORIDE, PRESERVATIVE FREE 10 ML: 5 INJECTION INTRAVENOUS at 20:18

## 2020-04-14 RX ADMIN — KETOTIFEN FUMARATE 1 DROP: 0.35 SOLUTION/ DROPS OPHTHALMIC at 02:11

## 2020-04-14 RX ADMIN — SODIUM CHLORIDE, PRESERVATIVE FREE 10 ML: 5 INJECTION INTRAVENOUS at 07:52

## 2020-04-14 RX ADMIN — INSULIN LISPRO 1 UNITS: 100 INJECTION, SOLUTION INTRAVENOUS; SUBCUTANEOUS at 20:22

## 2020-04-14 RX ADMIN — ENOXAPARIN SODIUM 50 MG: 60 INJECTION SUBCUTANEOUS at 09:56

## 2020-04-14 RX ADMIN — ONDANSETRON HYDROCHLORIDE 4 MG: 2 SOLUTION INTRAMUSCULAR; INTRAVENOUS at 09:56

## 2020-04-14 RX ADMIN — AZITHROMYCIN MONOHYDRATE 250 MG: 500 TABLET ORAL at 09:00

## 2020-04-14 RX ADMIN — ONDANSETRON HYDROCHLORIDE 4 MG: 2 SOLUTION INTRAMUSCULAR; INTRAVENOUS at 15:20

## 2020-04-14 RX ADMIN — ENOXAPARIN SODIUM 40 MG: 40 INJECTION SUBCUTANEOUS at 07:50

## 2020-04-14 RX ADMIN — SODIUM CHLORIDE, PRESERVATIVE FREE 10 ML: 5 INJECTION INTRAVENOUS at 09:34

## 2020-04-14 ASSESSMENT — PAIN SCALES - GENERAL
PAINLEVEL_OUTOF10: 0

## 2020-04-14 NOTE — CONSULTS
results for input(s): APTT in the last 72 hours. Images and reports from chest imaging were reviewed by me. My interpretation is:  CXR (4/14/20): Stable bilateral infiltrates; pacemaker in place      ECHO (5/22/15-  Health records)  Study Conclusions    - Left ventricle: The cavity size was normal. There was focal basal    hypertrophy of the septum. Systolic function was borderline    reduced. The estimated ejection fraction was 50%. Mild diffuse    hypokinesis. Doppler parameters are consistent with abnormal left    ventricular relaxation (grade 1 diastolic dysfunction). Ejection    fraction: 54.68% (MOD, 1-plane). - Right ventricle: Systolic function was normal by objective    interpretation. TAPSE: 2.2cm. Tricuspid annular systolic velocity:    18UY/Y.       Assessment:     Acute hypoxic respiratory failure  COVID-19 pneumonia    Plan:      Acute hypoxic respiratory failure  -Due to COVID-19 pneumonia  -Wean supplemental oxygen as able to keep oxygen saturation greater than 90%    COVID-19 pneumonia  -Azithromycin and Plaquenil      Prophylaxis  DVT- lovenox      MD Rekha Villa Code Pulmonology, Critical Care and Sleep

## 2020-04-14 NOTE — PROGRESS NOTES
Patchy/streaky airspace opacities to the lungs bilaterally, left somewhat   more than right. These are concerning for multifocal infectious/inflammatory   infiltrates with atypical/viral pneumonia in the differential.             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 *    Patient is a 79-year-old male with a history of Mobitz type II status post pacemaker, hypertension who presented to the ER with shortness of breath and fever along with diarrhea. He was admitted with COVID-19 infection, acute hypoxic respiratory failure with viral pneumonia. His labs were significant for elevated LDH d-dimer, CRP and lymphopenia. Patient was started on hydroxychloroquine with azithromycin. Multifocal pneumonia due to COVID 19 infection  -D-dimer LDH CRP elevated patient has lymphopenia  -Continue hydroxy chloroquine with azithromycin, day 4   -Monitor QTC, stable  -Continue droplet isolation    Acute hypoxic respiratory failure-worse patient had removed his oxygen. Transfered to the ICU for close monitoring. At present back to 6 L of oxygen-we will move him back to the stepdown unit  -wean as tolerated    Hypertension- Bp lower side  -Monitor blood pressure. decrease home medications    Hyponatremia-improved   -Monitor    Diarrhea- improving   -sec to COVID    Acute metabolic encephalopathy  -Secondary to infection. Monitor for recovery    New onset A. fib. Patient has a history of Mobitz 2 status post pacemaker  - will need anticoagulation.   Start therapeutic Lovenox      DVT Prophylaxis: lovenox  Diet: DIET GENERAL; Daily Fluid Restriction: 1500 ml  Code Status: Full Code        Discharge plan - ct care    The patient and / or the family were informed of the results of any tests, a time was given to answer questions, a plan was proposed and they agreed with plan. Discussed with consulting physicians, nursing and social work     The note was completed using EMR. Every effort was made to ensure accuracy; however, inadvertent computerized transcription errors may be present.        Danilo Appiah MD

## 2020-04-14 NOTE — PROGRESS NOTES
results for input(s): WBC, HGB, HCT, MCV, PLT in the last 72 hours. BMP:   Recent Labs     04/12/20  1131   *   K 4.2   CL 98*   CO2 22   BUN 19   CREATININE 1.2   GLUCOSE 166*     POC GLUCOSE:  No results for input(s): POCGLU in the last 72 hours. LIVER PROFILE:   No results for input(s): AST, ALT, LIPASE, AMYLASE, LABALBU, BILIDIR, BILITOT, ALKPHOS in the last 72 hours. PT/INR:   No results for input(s): PROTIME, INR in the last 72 hours. APTT: No results for input(s): APTT in the last 72 hours. UA:No results for input(s): NITRITE, COLORU, PHUR, LABCAST, WBCUA, RBCUA, MUCUS, TRICHOMONAS, YEAST, BACTERIA, CLARITYU, SPECGRAV, LEUKOCYTESUR, UROBILINOGEN, BILIRUBINUR, BLOODU, GLUCOSEU, KETUA, AMORPHOUS in the last 72 hours. Microbiology:  Wound Culture: No results for input(s): WNDABS, ORG in the last 72 hours. Invalid input(s):  LABGRAM  Nasal Culture: No results for input(s): ORG, MRSAPCR in the last 72 hours. Blood Culture:   No results for input(s): BC, BLOODCULT2 in the last 72 hours. Fungal Culture:   No results for input(s): FUNGSM in the last 72 hours. No results for input(s): FUNCXBLD in the last 72 hours. CSF Culture:  No results for input(s): COLORCSF, APPEARCSF, CFTUBE, CLOTCSF, WBCCSF, RBCCSF, NEUTCSF, NUMCELLSCSF, LYMPHSCSF, MONOCSF, GLUCCSF, VOLCSF in the last 72 hours. Respiratory Culture:  No results for input(s): Maribeth Mayi in the last 72 hours. AFB:No results for input(s): AFBSMEAR in the last 72 hours. Urine Culture  No results for input(s): LABURIN in the last 72 hours. RADIOLOGY:    XR CHEST PORTABLE   Final Result   Patchy multifocal airspace opacities are noted bilaterally without   significant change. Atypical/viral pneumonia is included in the differential.         XR CHEST PORTABLE   Final Result   Patchy/streaky airspace opacities to the lungs bilaterally, left somewhat   more than right.   These are concerning for multifocal infectious/inflammatory infiltrates with atypical/viral pneumonia in the differential.             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 *    Multifocal pneumonia due to COVID 19 infection  -D-dimer LDH CRP elevated patient has lymphopenia  -Continue hydroxy chloroquine with azithromycin  -Monitor QTC, stable  -Continue droplet isolation    Acute hypoxic respiratory failure- wean as tolerated   -Now needing 5 L of oxygen. Hypertension- Bp lower side  -Monitor blood pressure. decrease home medications    Hyponatremia-improved   -Monitor    Diarrhea  -sec to COVID    Acute metabolic encephalopathy  -Secondary to infection. Monitor for recovery      DVT Prophylaxis: lovenox  Diet: DIET GENERAL; Daily Fluid Restriction: 1500 ml  Code Status: Full Code        Discharge plan - ct care    The patient and / or the family were informed of the results of any tests, a time was given to answer questions, a plan was proposed and they agreed with plan. Discussed with consulting physicians, nursing and social work     The note was completed using EMR. Every effort was made to ensure accuracy; however, inadvertent computerized transcription errors may be present.        Meghann Chaparro MD

## 2020-04-14 NOTE — PROGRESS NOTES
primary RNs name, and pt's new room number so that information could be shared with his son and daughter. Report called to [facility] prior to pt transfer. Pt did stand and pivot from bed to wheelchair with RN assistance. His O2 sat initially 88% from activity but he recovered to > 90% on 14 L HFNC within a short time. Pt transported on 15 L per portable O2 tank. Pt stated he had all of his belongings at the time of transfer. Cell phone and  were placed with rest of belongings in a large, white plastic bag pt had from home. Pt assisted to ICU bed and attached to monitor. Pt left with ICU RN at bedside.

## 2020-04-15 PROBLEM — I48.0 PAF (PAROXYSMAL ATRIAL FIBRILLATION) (HCC): Status: ACTIVE | Noted: 2020-04-15

## 2020-04-15 LAB
ANION GAP SERPL CALCULATED.3IONS-SCNC: 12 MMOL/L (ref 3–16)
BASOPHILS ABSOLUTE: 0 K/UL (ref 0–0.2)
BASOPHILS RELATIVE PERCENT: 0.7 %
BUN BLDV-MCNC: 17 MG/DL (ref 7–20)
CALCIUM SERPL-MCNC: 8.6 MG/DL (ref 8.3–10.6)
CHLORIDE BLD-SCNC: 101 MMOL/L (ref 99–110)
CO2: 24 MMOL/L (ref 21–32)
CREAT SERPL-MCNC: 0.9 MG/DL (ref 0.8–1.3)
EOSINOPHILS ABSOLUTE: 0.1 K/UL (ref 0–0.6)
EOSINOPHILS RELATIVE PERCENT: 2.3 %
GFR AFRICAN AMERICAN: >60
GFR NON-AFRICAN AMERICAN: >60
GLUCOSE BLD-MCNC: 100 MG/DL (ref 70–99)
GLUCOSE BLD-MCNC: 107 MG/DL (ref 70–99)
GLUCOSE BLD-MCNC: 112 MG/DL (ref 70–99)
GLUCOSE BLD-MCNC: 128 MG/DL (ref 70–99)
GLUCOSE BLD-MCNC: 95 MG/DL (ref 70–99)
HCT VFR BLD CALC: 34.9 % (ref 40.5–52.5)
HEMOGLOBIN: 11.6 G/DL (ref 13.5–17.5)
L. PNEUMOPHILA SEROGP 1 UR AG: NORMAL
LYMPHOCYTES ABSOLUTE: 0.8 K/UL (ref 1–5.1)
LYMPHOCYTES RELATIVE PERCENT: 25.2 %
MAGNESIUM: 2.1 MG/DL (ref 1.8–2.4)
MCH RBC QN AUTO: 31.1 PG (ref 26–34)
MCHC RBC AUTO-ENTMCNC: 33.2 G/DL (ref 31–36)
MCV RBC AUTO: 93.7 FL (ref 80–100)
MONOCYTES ABSOLUTE: 0.2 K/UL (ref 0–1.3)
MONOCYTES RELATIVE PERCENT: 7.7 %
NEUTROPHILS ABSOLUTE: 2.1 K/UL (ref 1.7–7.7)
NEUTROPHILS RELATIVE PERCENT: 64.1 %
PDW BLD-RTO: 15.2 % (ref 12.4–15.4)
PERFORMED ON: ABNORMAL
PERFORMED ON: NORMAL
PHOSPHORUS: 3.1 MG/DL (ref 2.5–4.9)
PLATELET # BLD: 265 K/UL (ref 135–450)
PMV BLD AUTO: 8.8 FL (ref 5–10.5)
POTASSIUM SERPL-SCNC: 4.1 MMOL/L (ref 3.5–5.1)
RBC # BLD: 3.73 M/UL (ref 4.2–5.9)
SODIUM BLD-SCNC: 137 MMOL/L (ref 136–145)
STREP PNEUMONIAE ANTIGEN, URINE: NORMAL
WBC # BLD: 3.2 K/UL (ref 4–11)

## 2020-04-15 PROCEDURE — 83735 ASSAY OF MAGNESIUM: CPT

## 2020-04-15 PROCEDURE — 85025 COMPLETE CBC W/AUTO DIFF WBC: CPT

## 2020-04-15 PROCEDURE — 99232 SBSQ HOSP IP/OBS MODERATE 35: CPT | Performed by: INTERNAL MEDICINE

## 2020-04-15 PROCEDURE — 6360000002 HC RX W HCPCS: Performed by: INTERNAL MEDICINE

## 2020-04-15 PROCEDURE — 84100 ASSAY OF PHOSPHORUS: CPT

## 2020-04-15 PROCEDURE — 99222 1ST HOSP IP/OBS MODERATE 55: CPT | Performed by: INTERNAL MEDICINE

## 2020-04-15 PROCEDURE — 2060000000 HC ICU INTERMEDIATE R&B

## 2020-04-15 PROCEDURE — 80048 BASIC METABOLIC PNL TOTAL CA: CPT

## 2020-04-15 PROCEDURE — 36415 COLL VENOUS BLD VENIPUNCTURE: CPT

## 2020-04-15 PROCEDURE — 2580000003 HC RX 258: Performed by: NURSE PRACTITIONER

## 2020-04-15 PROCEDURE — 6370000000 HC RX 637 (ALT 250 FOR IP): Performed by: INTERNAL MEDICINE

## 2020-04-15 RX ADMIN — METOPROLOL SUCCINATE 50 MG: 50 TABLET, EXTENDED RELEASE ORAL at 09:15

## 2020-04-15 RX ADMIN — ENOXAPARIN SODIUM 90 MG: 100 INJECTION SUBCUTANEOUS at 09:15

## 2020-04-15 RX ADMIN — ENOXAPARIN SODIUM 90 MG: 100 INJECTION SUBCUTANEOUS at 20:44

## 2020-04-15 RX ADMIN — SODIUM CHLORIDE, PRESERVATIVE FREE 10 ML: 5 INJECTION INTRAVENOUS at 20:44

## 2020-04-15 RX ADMIN — AMLODIPINE BESYLATE 2.5 MG: 5 TABLET ORAL at 09:15

## 2020-04-15 RX ADMIN — SODIUM CHLORIDE, PRESERVATIVE FREE 10 ML: 5 INJECTION INTRAVENOUS at 10:04

## 2020-04-15 RX ADMIN — HYDROXYCHLOROQUINE SULFATE 200 MG: 200 TABLET ORAL at 09:15

## 2020-04-15 RX ADMIN — TAMSULOSIN HYDROCHLORIDE 0.4 MG: 0.4 CAPSULE ORAL at 09:15

## 2020-04-15 ASSESSMENT — PAIN SCALES - GENERAL
PAINLEVEL_OUTOF10: 0

## 2020-04-15 NOTE — PROGRESS NOTES
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 Completed 5 days of Plaquenil and azithromycin. Monitor QTc interval.  Blood culture, Legionella, strep pneumo antigen negative. #Acute respiratory failure with hypoxia satting 96% on 3 L. Wean off oxygen to keep SaO2 greater than 90%. Pulmonary following the patient. #A. fib on EKG done on 4/11  #Status post dual PPM 2/2015 secondary to Mobitz type II  We will consult cardiology for PPM interrogation for A. fib. And to decide regarding long-term anticoagulation if needed. #Acute metabolic encephalopathy improving. Nurse talked to daughter over the phone who reported that patient is able to drive by himself indeed he is independent in his ADLs. Patient seems to be slow to response. Continue to monitor. DVT Prophylaxis: Lovenox  Diet: DIET GENERAL; Daily Fluid Restriction: 1500 ml  Code Status: Full Code    PT/OT Eval Status: In progress    Dispo -inpatient. Follow-up with cardiology recommendation.     Chip Sanders MD

## 2020-04-15 NOTE — CONSULTS
use: Not on file      No family history on file. Review of Systems:  · Respiratory:  cough   Could not understand but did say he had a cough    Objective Data:     /65   Pulse 88   Temp 98.1 °F (36.7 °C) (Oral)   Resp 18   Ht 5' 11\" (1.803 m)   Wt 210 lb 5.1 oz (95.4 kg)   SpO2 95%   BMI 29.33 kg/m²     General appearance: alert and appears stated age  Alert, awake, oriented x 3  Eyes:  No erythema  Head: atraumatic  Neck:  no JVD  Lungs: rales apex - left  Heart: regular rate and rhythm, S1, S2 normal, no murmur, click, rub or gallop  Abdomen: soft, non-tender; bowel sounds normal; no masses,  no organomegaly  Extremities: extremities normal, atraumatic, no cyanosis or edema  Skin: Skin color, texture, turgor normal. No rashes or lesions  Hematologic: no remarkable bruising   Left pectoral pacer site intact    ECG: atrial fibrillation and left anterior hemiblock       Data Review      Echo:  5/21/2015    Study Conclusions    - Left ventricle: The cavity size was normal. There was focal basal    hypertrophy of the septum. Systolic function was borderline    reduced. The estimated ejection fraction was 50%. Mild diffuse    hypokinesis. Doppler parameters are consistent with abnormal left    ventricular relaxation (grade 1 diastolic dysfunction). Ejection    fraction: 54.68% (MOD, 1-plane). - Right ventricle: Systolic function was normal by objective    interpretation. TAPSE: 2.2cm. Tricuspid annular systolic velocity:    56HH/H.     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 [address]    COVID-19    Pneumonia due to COVID-19 virus    PAF (paroxysmal atrial fibrillation) ([facility] [address]  Resolved Problems:    * No resolved hospital problems. *      Plan:     1. The patient has Covid and maybe afib on that basis. He is on lovenox now and could continue. Will check his device for afib if we can in next day or so. If no afib on device other then 4/11/20, that would be reassuring. Will consider whether to treat short term with anticoagulation if no contraindications. If other episodes, then longterm anticoagulation. 2. Reviewed with RN and discussed with Dr. Garett Lopez.

## 2020-04-15 NOTE — PROGRESS NOTES
Pulmonary Progress Note    Date of Admission: 4/10/2020   LOS: 5 days       CC:  covid    Subjective:  shortness of breath but improving  Walking in room    ROS:   No nausea  No Vomiting  No chest pain         PHYSICAL EXAM:   Blood pressure 118/64, pulse 80, temperature 98.8 °F (37.1 °C), resp. rate 20, height 5' 11\" (1.803 m), weight 210 lb 5.1 oz (95.4 kg), SpO2 96 %.'  Gen: No distress. ENT:   Resp: No accessory muscle use. No crackles. No wheezes. No rhonchi. CV: Regular rate. Regular rhythm. No murmur or rub. No edema. Skin: Warm, dry, normal texture and turgor. No nodule on exposed extremities. M/S: No cyanosis. No clubbing. No joint deformity. Psych: Oriented x 3. No anxiety. Awake. Alert. Intact judgement and insight. Good Mood / Affect. Memory appears in tact     Medications:    Scheduled Meds:   lidocaine 1 % injection  5 mL Intradermal Once    sodium chloride flush  10 mL Intravenous 2 times per day    insulin lispro  0-6 Units Subcutaneous TID WC    insulin lispro  0-3 Units Subcutaneous Nightly    enoxaparin  1 mg/kg Subcutaneous BID    amLODIPine  2.5 mg Oral Daily    metoprolol succinate  50 mg Oral Daily    tamsulosin  0.4 mg Oral Daily       Continuous Infusions:   dextrose         PRN Meds:  sodium chloride flush, glucose, dextrose, glucagon (rDNA), dextrose, ketotifen, polyvinyl alcohol, acetaminophen **OR** acetaminophen, polyethylene glycol, promethazine **OR** ondansetron    Labs reviewed:  CBC:   Recent Labs     04/15/20  0530   WBC 3.2*   HGB 11.6*   HCT 34.9*   MCV 93.7        BMP:   Recent Labs     04/15/20  0530      K 4.1      CO2 24   PHOS 3.1   BUN 17   CREATININE 0.9     LIVER PROFILE: No results for input(s): AST, ALT, LIPASE, BILIDIR, BILITOT, ALKPHOS in the last 72 hours. Invalid input(s): AMYLASE,  ALB  PT/INR: No results for input(s): PROTIME, INR in the last 72 hours. APTT: No results for input(s): APTT in the last 72 hours.   UA:No results for input(s): NITRITE, COLORU, PHUR, LABCAST, WBCUA, RBCUA, MUCUS, TRICHOMONAS, YEAST, BACTERIA, CLARITYU, SPECGRAV, LEUKOCYTESUR, UROBILINOGEN, BILIRUBINUR, BLOODU, GLUCOSEU, AMORPHOUS in the last 72 hours. Invalid input(s): Dayron Sera  No results for input(s): PH, PCO2, PO2 in the last 72 hours. Cx:      Films:  Radiology Review:  Pertinent images / reports were reviewed as a part of this visit. CT Chest w/ contrast: No results found for this or any previous visit. CT Chest w/o contrast: No results found for this or any previous visit. CTPA: No results found for this or any previous visit. CXR PA/LAT: No results found for this or any previous visit. CXR portable:   Results for orders placed during the hospital encounter of 04/10/20   XR CHEST PORTABLE    Narrative EXAMINATION:  ONE XRAY VIEW OF THE CHEST    4/11/2020 4:51 am    COMPARISON:  Chest radiograph dated April 10, 2020    HISTORY:  ORDERING SYSTEM PROVIDED HISTORY: Hypoxemia, Tachypnea  TECHNOLOGIST PROVIDED HISTORY:  Reason for exam:->Hypoxemia, Tachypnea  Reason for Exam: Hypoxemia, Tachypnea  Relevant Medical/Surgical History: hx hypertension    FINDINGS:  The cardiomediastinal silhouette is stable. A left-sided intracardiac device  is noted. There is redemonstration of patchy multifocal airspace opacities  without significant change. There is no definite pneumothorax. There is no  large pleural effusion. Impression Patchy multifocal airspace opacities are noted bilaterally without  significant change. Atypical/viral pneumonia is included in the differential.          Assessment:         covid   Acute hypoxemia    Plan:        - slowly improving.   - QTc 485.  - continue Azithro / Plaquenil.   - Wean O2 to sat >90%  - @ 3L NC         This note was transcribed using [id number] [address] [employer]. Please disregard any translational errors.       [address] [place] [facility]

## 2020-04-16 LAB
ANION GAP SERPL CALCULATED.3IONS-SCNC: 12 MMOL/L (ref 3–16)
ANISOCYTOSIS: ABNORMAL
BANDED NEUTROPHILS RELATIVE PERCENT: 3 % (ref 0–7)
BASOPHILS ABSOLUTE: 0 K/UL (ref 0–0.2)
BASOPHILS RELATIVE PERCENT: 1 %
BUN BLDV-MCNC: 19 MG/DL (ref 7–20)
CALCIUM SERPL-MCNC: 9 MG/DL (ref 8.3–10.6)
CHLORIDE BLD-SCNC: 103 MMOL/L (ref 99–110)
CO2: 24 MMOL/L (ref 21–32)
CREAT SERPL-MCNC: 0.8 MG/DL (ref 0.8–1.3)
EOSINOPHILS ABSOLUTE: 0.1 K/UL (ref 0–0.6)
EOSINOPHILS RELATIVE PERCENT: 3 %
GFR AFRICAN AMERICAN: >60
GFR NON-AFRICAN AMERICAN: >60
GLUCOSE BLD-MCNC: 109 MG/DL (ref 70–99)
GLUCOSE BLD-MCNC: 109 MG/DL (ref 70–99)
GLUCOSE BLD-MCNC: 112 MG/DL (ref 70–99)
GLUCOSE BLD-MCNC: 122 MG/DL (ref 70–99)
GLUCOSE BLD-MCNC: 131 MG/DL (ref 70–99)
HCT VFR BLD CALC: 35.4 % (ref 40.5–52.5)
HEMOGLOBIN: 11.6 G/DL (ref 13.5–17.5)
LYMPHOCYTES ABSOLUTE: 0.5 K/UL (ref 1–5.1)
LYMPHOCYTES RELATIVE PERCENT: 16 %
MAGNESIUM: 2.2 MG/DL (ref 1.8–2.4)
MCH RBC QN AUTO: 30.8 PG (ref 26–34)
MCHC RBC AUTO-ENTMCNC: 32.9 G/DL (ref 31–36)
MCV RBC AUTO: 93.5 FL (ref 80–100)
MONOCYTES ABSOLUTE: 0.2 K/UL (ref 0–1.3)
MONOCYTES RELATIVE PERCENT: 5 %
NEUTROPHILS ABSOLUTE: 2.6 K/UL (ref 1.7–7.7)
NEUTROPHILS RELATIVE PERCENT: 72 %
PDW BLD-RTO: 15 % (ref 12.4–15.4)
PERFORMED ON: ABNORMAL
PHOSPHORUS: 3 MG/DL (ref 2.5–4.9)
PLATELET # BLD: 303 K/UL (ref 135–450)
PMV BLD AUTO: 8.6 FL (ref 5–10.5)
POTASSIUM SERPL-SCNC: 4 MMOL/L (ref 3.5–5.1)
RBC # BLD: 3.78 M/UL (ref 4.2–5.9)
SODIUM BLD-SCNC: 139 MMOL/L (ref 136–145)
WBC # BLD: 3.4 K/UL (ref 4–11)

## 2020-04-16 PROCEDURE — 6360000002 HC RX W HCPCS: Performed by: INTERNAL MEDICINE

## 2020-04-16 PROCEDURE — 97530 THERAPEUTIC ACTIVITIES: CPT

## 2020-04-16 PROCEDURE — 2060000000 HC ICU INTERMEDIATE R&B

## 2020-04-16 PROCEDURE — 6370000000 HC RX 637 (ALT 250 FOR IP): Performed by: INTERNAL MEDICINE

## 2020-04-16 PROCEDURE — 2580000003 HC RX 258: Performed by: NURSE PRACTITIONER

## 2020-04-16 PROCEDURE — 97166 OT EVAL MOD COMPLEX 45 MIN: CPT

## 2020-04-16 PROCEDURE — 84100 ASSAY OF PHOSPHORUS: CPT

## 2020-04-16 PROCEDURE — 85025 COMPLETE CBC W/AUTO DIFF WBC: CPT

## 2020-04-16 PROCEDURE — 83735 ASSAY OF MAGNESIUM: CPT

## 2020-04-16 PROCEDURE — 97535 SELF CARE MNGMENT TRAINING: CPT

## 2020-04-16 PROCEDURE — 97162 PT EVAL MOD COMPLEX 30 MIN: CPT

## 2020-04-16 PROCEDURE — 97116 GAIT TRAINING THERAPY: CPT

## 2020-04-16 PROCEDURE — 80048 BASIC METABOLIC PNL TOTAL CA: CPT

## 2020-04-16 PROCEDURE — 99232 SBSQ HOSP IP/OBS MODERATE 35: CPT | Performed by: INTERNAL MEDICINE

## 2020-04-16 RX ADMIN — SODIUM CHLORIDE, PRESERVATIVE FREE 10 ML: 5 INJECTION INTRAVENOUS at 19:58

## 2020-04-16 RX ADMIN — POLYVINYL ALCOHOL 1 DROP: 14 SOLUTION/ DROPS OPHTHALMIC at 06:50

## 2020-04-16 RX ADMIN — AMLODIPINE BESYLATE 2.5 MG: 5 TABLET ORAL at 08:59

## 2020-04-16 RX ADMIN — ENOXAPARIN SODIUM 90 MG: 100 INJECTION SUBCUTANEOUS at 19:58

## 2020-04-16 RX ADMIN — TAMSULOSIN HYDROCHLORIDE 0.4 MG: 0.4 CAPSULE ORAL at 08:59

## 2020-04-16 RX ADMIN — SODIUM CHLORIDE, PRESERVATIVE FREE 10 ML: 5 INJECTION INTRAVENOUS at 10:52

## 2020-04-16 RX ADMIN — ENOXAPARIN SODIUM 90 MG: 100 INJECTION SUBCUTANEOUS at 08:59

## 2020-04-16 RX ADMIN — METOPROLOL SUCCINATE 50 MG: 50 TABLET, EXTENDED RELEASE ORAL at 08:59

## 2020-04-16 ASSESSMENT — PAIN SCALES - GENERAL: PAINLEVEL_OUTOF10: 0

## 2020-04-16 NOTE — PROGRESS NOTES
Physical Therapy    Facility/Department: [facility] 5N PROGRESSIVE CARE  Initial Assessment    NAME: Glenda Soria  : 1940  MRN: [id number]    Date of Service: 2020    Discharge Recommendations:  Continue to assess pending progress        Assessment   Body structures, Functions, Activity limitations: Decreased functional mobility ; Decreased safe awareness;Decreased cognition;Decreased endurance  Assessment: 77 y/o male admit 4/10/2020 with Pneumonia, Elevated D-Dimer. COVID 19 +. Recent ER 2020 Pneumonia. PMH : Pacemaker. Per SW : pta pt living with dtr in apt setting with steps to enter. Pt gerry OOB activities although generally confused/difficulty following commands (cognitive/lang issues). Unclear d/c plan; should be able to return home if adequate assist/support. Will monitor pt's progress. Prognosis: Good  Decision Making: Medium Complexity  History: 77 y/o male admit 4/10/2020 with Pneumonia, Elevated D-Dimer. COVID 19 +. Recent ER 2020 Pneumonia. PMH : Pacemaker. Exam: See above. Clinical Presentation: See above. Patient Education: Role of PT, POC, Need to call for assist.    Barriers to Learning: Cognitive. REQUIRES PT FOLLOW UP: Yes  Activity Tolerance  Activity Tolerance: Patient limited by cognitive status       Patient Diagnosis(es): The primary encounter diagnosis was Pneumonia due to organism. Diagnoses of Suspected COVID-19 virus infection, Fever, unspecified fever cause, Shortness of breath, Cough, Elevated d-dimer, Elevated LDH, Elevated C-reactive protein (CRP), Elevated procalcitonin, and Viral illness were also pertinent to this visit. has a past medical history of Hypertension. has a past surgical history that includes Pacemaker insertion. Restrictions  Restrictions/Precautions  Restrictions/Precautions: Fall Risk  Position Activity Restriction  Other position/activity restrictions: Contact/Droplet : + COVID 19.     Vision/Hearing  Vision: Within required to identify errors made  Insights: Decreased awareness of deficits  Initiation: Requires cues for some  Sequencing: Requires cues for some    Objective          AROM RLE (degrees)  RLE AROM: WFL  AROM LLE (degrees)  LLE AROM : WFL  AROM RUE (degrees)  RUE AROM : WFL  AROM LUE (degrees)  LUE AROM : WFL  Strength RLE  Strength RLE: WFL  Strength LLE  Strength LLE: WFL        Bed mobility  Supine to Sit: Contact guard assistance(HOB elevated.  )  Transfers  Sit to Stand: Contact guard assistance  Stand to sit: Contact guard assistance  Comment: Difficulty with pt initiation due to cognitive/lang issues. Simple cues with repetition. Ambulation  Ambulation?: Yes  Ambulation 1  Surface: level tile  Device: No Device  Distance: Pt amb bed to chair, chair to bathroom (~20') without assist device Handheld/Min assist.  No LE buckling/giving way although mild gait path veer. Ambulation 2  Surface - 2: level tile  Device 2: Rolling Walker  Distance: Pt amb bathroom to chair (~20') with Rolling Walker CGA. Generally more stable with use of Walker although more safety issue due to unfamiliar with use of Walker. Initial steps with use of Walker, 1 hand on  although cues to place other hand onto Archie Brandon also. Plan   Plan  Times per week: 3-5x week while in acute care setting.     Current Treatment Recommendations: Functional Mobility Training, Transfer Training, Gait Training, Safety Education & Training, Patient/Caregiver Education & Training  Safety Devices  Type of devices: Call light within reach, Chair alarm in place, Left in chair, Telesitter in use, Nurse notified      AM-PAC Score  AM-PAC Inpatient Mobility Raw Score : 17 (04/16/20 0925)  AM-PAC Inpatient T-Scale Score : 42.13 (04/16/20 0925)  Mobility Inpatient CMS 0-100% Score: 50.57 (04/16/20 0925)  Mobility Inpatient CMS G-Code Modifier : CK (04/16/20 0925)          Goals  Short term goals  Time Frame for Short term goals: Upon d/c

## 2020-04-16 NOTE — PROGRESS NOTES
Hospitalist Progress Note      PCP: No primary care provider on file. Date of Admission: 4/10/2020    Chief Complaint: SOB    Hospital Course: This is a 24-year-old gentleman with a history of syncope due to Mobitz type II status post dual PPM 5/2015, hypertension Was admitted on 4/10 for shortness of breath and fever. Patient is COVID-19 positive. Completed 5 days of hydroxychloroquine and azithromycin. Subjective: Patient seen and evaluated at the bedside. Patient denies active chest pain shortness of breath nausea vomiting  Medications:  Reviewed    Infusion Medications    dextrose       Scheduled Medications    lidocaine 1 % injection  5 mL Intradermal Once    sodium chloride flush  10 mL Intravenous 2 times per day    insulin lispro  0-6 Units Subcutaneous TID WC    insulin lispro  0-3 Units Subcutaneous Nightly    enoxaparin  1 mg/kg Subcutaneous BID    amLODIPine  2.5 mg Oral Daily    metoprolol succinate  50 mg Oral Daily    tamsulosin  0.4 mg Oral Daily     PRN Meds: sodium chloride flush, glucose, dextrose, glucagon (rDNA), dextrose, ketotifen, polyvinyl alcohol, acetaminophen **OR** acetaminophen, polyethylene glycol, promethazine **OR** ondansetron      Intake/Output Summary (Last 24 hours) at 4/16/2020 1753  Last data filed at 4/16/2020 0600  Gross per 24 hour   Intake 240 ml   Output --   Net 240 ml       Physical Exam Performed:    /78   Pulse 82   Temp 97.4 °F (36.3 °C)   Resp 18   Ht 5' 11\" (1.803 m)   Wt 206 lb 12.7 oz (93.8 kg)   SpO2 95%   BMI 28.84 kg/m²     General appearance: No apparent distress, slow to response  HEENT: Pupils equal, round, and reactive to light. Conjunctivae/corneas clear. Neck: Supple, with full range of motion. No jugular venous distention. Trachea midline. Respiratory:  Normal respiratory effort. Clear to auscultation, bilaterally without Rales/Wheezes/Rhonchi.   Cardiovascular: Regular rate and rhythm with normal S1/S2 without murmurs, rubs or gallops. Abdomen: Soft, non-tender, non-distended with normal bowel sounds. Musculoskeletal: No clubbing, cyanosis or edema bilaterally. Full range of motion without deformity. Skin: Skin color, texture, turgor normal.  No rashes or lesions. Neurologic:  Neurovascularly intact without any focal sensory/motor deficits. Cranial nerves: II-XII intact, grossly non-focal.  Psychiatric: Alert and oriented, thought content appropriate, normal insight    Labs:   Recent Labs     04/15/20  0530 04/16/20  0601   WBC 3.2* 3.4*   HGB 11.6* 11.6*   HCT 34.9* 35.4*    303     Recent Labs     04/15/20  0530 04/16/20  0601    139   K 4.1 4.0    103   CO2 24 24   BUN 17 19   CREATININE 0.9 0.8   CALCIUM 8.6 9.0   PHOS 3.1 3.0     No results for input(s): AST, ALT, BILIDIR, BILITOT, ALKPHOS in the last 72 hours. No results for input(s): INR in the last 72 hours. No results for input(s): Flavia Newer in the last 72 hours. Urinalysis:    No results found for: Viri Finnegan, BACTERIA, RBCUA, BLOODU, SPECGRAV, [address]    Radiology:  XR CHEST 1 VW   Final Result   No change bilateral airspace disease representing multifocal infection. XR CHEST PORTABLE   Final Result   Patchy multifocal airspace opacities are noted bilaterally without   significant change. Atypical/viral pneumonia is included in the differential.         XR CHEST PORTABLE   Final Result   Patchy/streaky airspace opacities to the lungs bilaterally, left somewhat   more than right.   These are concerning for multifocal infectious/inflammatory   infiltrates with atypical/viral pneumonia in the differential.                 Assessment/Plan:    Active Hospital Problems    Diagnosis Date Noted    PAF (paroxysmal atrial fibrillation) ([facility] [address] [I48.0] 04/15/2020    COVID-19 [U07.1, J98.8]     Pneumonia due to COVID-19 virus [U07.1, J12.89]     Acute respiratory failure with hypoxemia ([facility] [address] [J96.01] 04/11/2020   

## 2020-04-16 NOTE — PROGRESS NOTES
Occupational Therapy   Occupational Therapy Initial Assessment and Tentative D/C    Date: 2020   Patient Name: Jefferson Rhoades  MRN: [id number]     : 1940    Date of Service: 2020    Discharge Recommendations: Jefferson Rhoades scored a 21/24 on the AM-PAC ADL Inpatient form. Current research shows that an AM-PAC score of 18 or greater is typically associated with a discharge to the patient's home setting. Based on the patients AM-PAC score and their current ADL deficits, it is recommended that the patient have 2-3 sessions per week of Occupational Therapy at d/c to increase the patients independence. If patient discharges prior to next session this note will serve as a discharge summary. Please see below for the latest assessment towards goals. Continue to assess pending progress, 24 hour supervision or assist, 2-3 sessions per week  OT Equipment Recommendations  Other: continue to assess    Assessment   Performance deficits / Impairments: Decreased functional mobility ; Decreased strength;Decreased endurance;Decreased ADL status; Decreased safe awareness;Decreased balance;Decreased high-level IADLs;Decreased cognition  Assessment: Jefferson Rhoades is a 78 y.o. male who presents the ED today with covid-19-like symptoms. They have been ongoing for the past couple of days according to the patient and family member. Including elevated temperature at home, shortness of breath and cough. Cough is nonproductive. He has generalized weakness and body aches. No abdominal pain, nausea vomiting or diarrhea. States that he is not diabetic or immunocompromise. Came to the ED for further evaluation and treatment as he was seen at [facility] yesterday, was diagnosed with pneumonia and given antibiotics but he states that he is been feeling worse over the past 24 hours. PTA pt from home with family where he was Ind with mobility and ADLs without device.  Pt currently functionging below baseline completing mobility with weakness and body aches. No abdominal pain, nausea vomiting or diarrhea. States that he is not diabetic or immunocompromise. Came to the ED for further evaluation and treatment as he was seen at [facility] yesterday, was diagnosed with pneumonia and given antibiotics but he states that he is been feeling worse over the past 24 hours. Family / Caregiver Present: No  Referring Practitioner: Tanesha Garrett MD  Subjective  Subjective: Pt supine in bed upon arrival and agreeable to OT evaluation. Pt reports no pain. Pt with noted language barrier. General Comment  Comments: okay for therapy per RN. Vital Signs  Level of Consciousness: Alert  Social/Functional History  Social/Functional History  Lives With: Daughter  Type of Home: Apartment(2nd floor apt. No elevator. )  Home Layout: One level  Home Access: Stairs to enter with rails  ADL Assistance: Independent  Ambulation Assistance: Independent  Transfer Assistance: Independent  Active : Yes  IADL Comments: Pt SW note : pt is \"very strong  man, very independent\" and goes to the gym when open. Additional Comments: Information obtained from SW note per conversation with dtr (4/13/2020). Unable to clarify, attempt to contact dtr unsuccessful. Objective   Vision: Within Functional Limits  Hearing: Within functional limits    Orientation  Overall Orientation Status: Impaired(difficulty assessing)  Orientation Level: Oriented to person     Balance  Sitting Balance: Stand by assistance(seated EOB)  Standing Balance: Contact guard assistance(with and without RW; HHA)  Functional Mobility  Functional - Mobility Device: Other(RW and HHA)  Activity: To/from bathroom; Other(short distances in room)  Assist Level: Minimal assistance  Functional Mobility Comments: Pt needing Min A for ambulating with HHA; CGA with use of RW although pt needing max cues for managing RW; no LOB; SpO2 >90% throughout ambulation although pt reporting some fatigue  Toilet WFL  LUE Strength  Gross LUE Strength: WFL  RUE Strength  Gross RUE Strength: WFL                   Plan   Plan  Times per week: 2-3x  Current Treatment Recommendations: Strengthening, Functional Mobility Training, Gait Training, Endurance Training, Balance Training, Self-Care / ADL, Safety Education & Training, Equipment Evaluation, Education, & procurement, Patient/Caregiver Education & Training, Cognitive Reorientation      AM-PAC Score        AM-PAC Inpatient Daily Activity Raw Score: 21 (04/16/20 0941)  AM-PAC Inpatient ADL T-Scale Score : 44.27 (04/16/20 0941)  ADL Inpatient CMS 0-100% Score: 32.79 (04/16/20 0941)  ADL Inpatient CMS G-Code Modifier : Flordia Reasons (04/16/20 0941)    Goals  Short term goals  Time Frame for Short term goals: prior to D/C  Short term goal 1: complete functional mobility and transfers with supervision  Short term goal 2: complete dressing and bathing with supervision  Short term goal 3: complete grooming in stance at sink with supervision  Short term goal 4: complete toileting with supervision  Short term goal 5: tolerated B UE exercises x10 reps for increased strength and endurance with ADLs  Long term goals  Time Frame for Long term goals : STG=LTG  Patient Goals   Patient goals : no stated goals       Therapy Time   Individual Concurrent Group Co-treatment   Time In 0730         Time Out 0810         Minutes 40         Timed Code Treatment Minutes: 25 Minutes(15  minute eval)       STEPHEN Monteiro/TRISH

## 2020-04-16 NOTE — PROGRESS NOTES
Pt is alert and oriented to person and place. Pt repeats questions. Pt states he wants to eat but will not eat his dinner and refuses help. Pt has the ability to feed self. Pt has poor short term memory. Will continue to monitor.

## 2020-04-17 LAB
ANION GAP SERPL CALCULATED.3IONS-SCNC: 13 MMOL/L (ref 3–16)
BASOPHILS ABSOLUTE: 0.1 K/UL (ref 0–0.2)
BASOPHILS RELATIVE PERCENT: 1.5 %
BUN BLDV-MCNC: 16 MG/DL (ref 7–20)
CALCIUM SERPL-MCNC: 8.9 MG/DL (ref 8.3–10.6)
CHLORIDE BLD-SCNC: 102 MMOL/L (ref 99–110)
CO2: 23 MMOL/L (ref 21–32)
CREAT SERPL-MCNC: 0.8 MG/DL (ref 0.8–1.3)
EOSINOPHILS ABSOLUTE: 0.1 K/UL (ref 0–0.6)
EOSINOPHILS RELATIVE PERCENT: 2.5 %
GFR AFRICAN AMERICAN: >60
GFR NON-AFRICAN AMERICAN: >60
GLUCOSE BLD-MCNC: 103 MG/DL (ref 70–99)
GLUCOSE BLD-MCNC: 103 MG/DL (ref 70–99)
GLUCOSE BLD-MCNC: 107 MG/DL (ref 70–99)
GLUCOSE BLD-MCNC: 107 MG/DL (ref 70–99)
GLUCOSE BLD-MCNC: 99 MG/DL (ref 70–99)
HCT VFR BLD CALC: 34.2 % (ref 40.5–52.5)
HEMOGLOBIN: 11.4 G/DL (ref 13.5–17.5)
LYMPHOCYTES ABSOLUTE: 0.9 K/UL (ref 1–5.1)
LYMPHOCYTES RELATIVE PERCENT: 26.4 %
MAGNESIUM: 2 MG/DL (ref 1.8–2.4)
MCH RBC QN AUTO: 31 PG (ref 26–34)
MCHC RBC AUTO-ENTMCNC: 33.4 G/DL (ref 31–36)
MCV RBC AUTO: 92.9 FL (ref 80–100)
MONOCYTES ABSOLUTE: 0.4 K/UL (ref 0–1.3)
MONOCYTES RELATIVE PERCENT: 10.7 %
NEUTROPHILS ABSOLUTE: 2 K/UL (ref 1.7–7.7)
NEUTROPHILS RELATIVE PERCENT: 58.9 %
PDW BLD-RTO: 14.8 % (ref 12.4–15.4)
PERFORMED ON: ABNORMAL
PHOSPHORUS: 3 MG/DL (ref 2.5–4.9)
PLATELET # BLD: 313 K/UL (ref 135–450)
PMV BLD AUTO: 8.6 FL (ref 5–10.5)
POTASSIUM SERPL-SCNC: 3.8 MMOL/L (ref 3.5–5.1)
RBC # BLD: 3.69 M/UL (ref 4.2–5.9)
SODIUM BLD-SCNC: 138 MMOL/L (ref 136–145)
WBC # BLD: 3.4 K/UL (ref 4–11)

## 2020-04-17 PROCEDURE — 94761 N-INVAS EAR/PLS OXIMETRY MLT: CPT

## 2020-04-17 PROCEDURE — 2580000003 HC RX 258: Performed by: NURSE PRACTITIONER

## 2020-04-17 PROCEDURE — 83735 ASSAY OF MAGNESIUM: CPT

## 2020-04-17 PROCEDURE — 6370000000 HC RX 637 (ALT 250 FOR IP): Performed by: INTERNAL MEDICINE

## 2020-04-17 PROCEDURE — 85025 COMPLETE CBC W/AUTO DIFF WBC: CPT

## 2020-04-17 PROCEDURE — 80048 BASIC METABOLIC PNL TOTAL CA: CPT

## 2020-04-17 PROCEDURE — 99231 SBSQ HOSP IP/OBS SF/LOW 25: CPT | Performed by: INTERNAL MEDICINE

## 2020-04-17 PROCEDURE — 2060000000 HC ICU INTERMEDIATE R&B

## 2020-04-17 PROCEDURE — 94680 O2 UPTK RST&XERS DIR SIMPLE: CPT

## 2020-04-17 PROCEDURE — 6360000002 HC RX W HCPCS: Performed by: INTERNAL MEDICINE

## 2020-04-17 PROCEDURE — 2700000000 HC OXYGEN THERAPY PER DAY

## 2020-04-17 PROCEDURE — 84100 ASSAY OF PHOSPHORUS: CPT

## 2020-04-17 RX ADMIN — APIXABAN 5 MG: 5 TABLET, FILM COATED ORAL at 18:29

## 2020-04-17 RX ADMIN — SODIUM CHLORIDE, PRESERVATIVE FREE 10 ML: 5 INJECTION INTRAVENOUS at 09:25

## 2020-04-17 RX ADMIN — METOPROLOL SUCCINATE 50 MG: 50 TABLET, EXTENDED RELEASE ORAL at 09:24

## 2020-04-17 RX ADMIN — SODIUM CHLORIDE, PRESERVATIVE FREE 10 ML: 5 INJECTION INTRAVENOUS at 20:13

## 2020-04-17 RX ADMIN — TAMSULOSIN HYDROCHLORIDE 0.4 MG: 0.4 CAPSULE ORAL at 09:24

## 2020-04-17 RX ADMIN — POLYVINYL ALCOHOL 1 DROP: 14 SOLUTION/ DROPS OPHTHALMIC at 05:45

## 2020-04-17 RX ADMIN — AMLODIPINE BESYLATE 2.5 MG: 5 TABLET ORAL at 09:25

## 2020-04-17 RX ADMIN — ENOXAPARIN SODIUM 90 MG: 100 INJECTION SUBCUTANEOUS at 09:25

## 2020-04-17 ASSESSMENT — PAIN SCALES - GENERAL
PAINLEVEL_OUTOF10: 0

## 2020-04-17 NOTE — PROGRESS NOTES
In to room d/t telesitter alarm. Patient was attempting to get out of bed to use the restroom. Patient assisted with one with walker to the bathroom and brushed teeth. Assisted back to bed. Vitals and assessment obtained at this time.   Will return for meds and breakfast.

## 2020-04-18 LAB
ANION GAP SERPL CALCULATED.3IONS-SCNC: 12 MMOL/L (ref 3–16)
BASOPHILS ABSOLUTE: 0 K/UL (ref 0–0.2)
BASOPHILS RELATIVE PERCENT: 1.1 %
BUN BLDV-MCNC: 13 MG/DL (ref 7–20)
CALCIUM SERPL-MCNC: 9.1 MG/DL (ref 8.3–10.6)
CHLORIDE BLD-SCNC: 104 MMOL/L (ref 99–110)
CO2: 24 MMOL/L (ref 21–32)
CREAT SERPL-MCNC: 0.8 MG/DL (ref 0.8–1.3)
EOSINOPHILS ABSOLUTE: 0 K/UL (ref 0–0.6)
EOSINOPHILS RELATIVE PERCENT: 1.4 %
GFR AFRICAN AMERICAN: >60
GFR NON-AFRICAN AMERICAN: >60
GLUCOSE BLD-MCNC: 100 MG/DL (ref 70–99)
GLUCOSE BLD-MCNC: 124 MG/DL (ref 70–99)
GLUCOSE BLD-MCNC: 94 MG/DL (ref 70–99)
GLUCOSE BLD-MCNC: 98 MG/DL (ref 70–99)
GLUCOSE BLD-MCNC: 99 MG/DL (ref 70–99)
HCT VFR BLD CALC: 35 % (ref 40.5–52.5)
HEMOGLOBIN: 11.6 G/DL (ref 13.5–17.5)
LYMPHOCYTES ABSOLUTE: 1 K/UL (ref 1–5.1)
LYMPHOCYTES RELATIVE PERCENT: 31.6 %
MAGNESIUM: 1.9 MG/DL (ref 1.8–2.4)
MCH RBC QN AUTO: 30.9 PG (ref 26–34)
MCHC RBC AUTO-ENTMCNC: 33.2 G/DL (ref 31–36)
MCV RBC AUTO: 93.1 FL (ref 80–100)
MONOCYTES ABSOLUTE: 0.3 K/UL (ref 0–1.3)
MONOCYTES RELATIVE PERCENT: 10.2 %
NEUTROPHILS ABSOLUTE: 1.8 K/UL (ref 1.7–7.7)
NEUTROPHILS RELATIVE PERCENT: 55.7 %
PDW BLD-RTO: 15 % (ref 12.4–15.4)
PERFORMED ON: ABNORMAL
PERFORMED ON: ABNORMAL
PERFORMED ON: NORMAL
PERFORMED ON: NORMAL
PHOSPHORUS: 3 MG/DL (ref 2.5–4.9)
PLATELET # BLD: 325 K/UL (ref 135–450)
PMV BLD AUTO: 8.8 FL (ref 5–10.5)
POTASSIUM SERPL-SCNC: 3.8 MMOL/L (ref 3.5–5.1)
RBC # BLD: 3.76 M/UL (ref 4.2–5.9)
SODIUM BLD-SCNC: 140 MMOL/L (ref 136–145)
WBC # BLD: 3.3 K/UL (ref 4–11)

## 2020-04-18 PROCEDURE — 84100 ASSAY OF PHOSPHORUS: CPT

## 2020-04-18 PROCEDURE — 2060000000 HC ICU INTERMEDIATE R&B

## 2020-04-18 PROCEDURE — 6370000000 HC RX 637 (ALT 250 FOR IP): Performed by: INTERNAL MEDICINE

## 2020-04-18 PROCEDURE — 2580000003 HC RX 258: Performed by: NURSE PRACTITIONER

## 2020-04-18 PROCEDURE — 80048 BASIC METABOLIC PNL TOTAL CA: CPT

## 2020-04-18 PROCEDURE — 83735 ASSAY OF MAGNESIUM: CPT

## 2020-04-18 PROCEDURE — 85025 COMPLETE CBC W/AUTO DIFF WBC: CPT

## 2020-04-18 RX ADMIN — SODIUM CHLORIDE, PRESERVATIVE FREE 10 ML: 5 INJECTION INTRAVENOUS at 23:35

## 2020-04-18 RX ADMIN — SODIUM CHLORIDE, PRESERVATIVE FREE 10 ML: 5 INJECTION INTRAVENOUS at 09:44

## 2020-04-18 RX ADMIN — AMLODIPINE BESYLATE 2.5 MG: 5 TABLET ORAL at 08:51

## 2020-04-18 RX ADMIN — METOPROLOL SUCCINATE 50 MG: 50 TABLET, EXTENDED RELEASE ORAL at 08:51

## 2020-04-18 RX ADMIN — APIXABAN 5 MG: 5 TABLET, FILM COATED ORAL at 23:35

## 2020-04-18 RX ADMIN — APIXABAN 5 MG: 5 TABLET, FILM COATED ORAL at 08:51

## 2020-04-18 RX ADMIN — TAMSULOSIN HYDROCHLORIDE 0.4 MG: 0.4 CAPSULE ORAL at 08:51

## 2020-04-18 ASSESSMENT — PAIN SCALES - GENERAL: PAINLEVEL_OUTOF10: 0

## 2020-04-18 NOTE — PROGRESS NOTES
Pt is alert but orientation can't be assessed. Pt seems more confused some times than others. Pt will follow commands sometimes. DAISY [facility] due to communication problems. Will continue to monitor.

## 2020-04-18 NOTE — CARE COORDINATION
EFFIE received phone call from Madyson WINN, [place] at [facility] , [phone]. She stated that they are not accepting COVID+ patients at this time. EFFIE thanked her for her time.      Respectfully submitted,     Ninoska PAZ, SHANNAN-S  [facility]   [phone]    Electronically signed by NICK Encinas on 4/18/2020 at 9:53 AM

## 2020-04-18 NOTE — PROGRESS NOTES
Nutrition Assessment (Low Risk)    Type and Reason for Visit: Initial(LOS)    Nutrition Recommendations:   Continue General diet. Fluid restriction per provider. Nutrition Assessment:  Patient assessed for nutritional risk. Deemed to be at low risk at this time. Will continue to monitor for changes in status.       Malnutrition Assessment:  · Malnutrition Status: No malnutrition    Nutrition Risk Level   Risk Level: Low    Nutrition Diagnosis:   · Problem: No nutrition diagnosis at this time    Nutrition Intervention:  Food and/or Delivery: Continue current diet  Nutrition Education/Counseling/Coordination of Care:  Continued Inpatient Monitoring      Electronically signed by Ema Barrientos RD, LD on 4/18/20 at 1:45 PM EDT    Contact Number: [phone]

## 2020-04-18 NOTE — PROGRESS NOTES
Pt is still in and out of confusion without any pattern. Pt was unable to put a cup of water to his mouth and drink without help. Will continue to monitor but for now this pt should be considered in need of being helped to feed.

## 2020-04-19 LAB
ANION GAP SERPL CALCULATED.3IONS-SCNC: 10 MMOL/L (ref 3–16)
BASOPHILS ABSOLUTE: 0 K/UL (ref 0–0.2)
BASOPHILS RELATIVE PERCENT: 1.4 %
BUN BLDV-MCNC: 15 MG/DL (ref 7–20)
CALCIUM SERPL-MCNC: 9.1 MG/DL (ref 8.3–10.6)
CHLORIDE BLD-SCNC: 100 MMOL/L (ref 99–110)
CO2: 25 MMOL/L (ref 21–32)
CREAT SERPL-MCNC: 0.9 MG/DL (ref 0.8–1.3)
EOSINOPHILS ABSOLUTE: 0 K/UL (ref 0–0.6)
EOSINOPHILS RELATIVE PERCENT: 0.9 %
GFR AFRICAN AMERICAN: >60
GFR NON-AFRICAN AMERICAN: >60
GLUCOSE BLD-MCNC: 101 MG/DL (ref 70–99)
GLUCOSE BLD-MCNC: 104 MG/DL (ref 70–99)
GLUCOSE BLD-MCNC: 106 MG/DL (ref 70–99)
GLUCOSE BLD-MCNC: 109 MG/DL (ref 70–99)
GLUCOSE BLD-MCNC: 93 MG/DL (ref 70–99)
HCT VFR BLD CALC: 34.9 % (ref 40.5–52.5)
HEMOGLOBIN: 11.4 G/DL (ref 13.5–17.5)
LYMPHOCYTES ABSOLUTE: 0.8 K/UL (ref 1–5.1)
LYMPHOCYTES RELATIVE PERCENT: 22.8 %
MAGNESIUM: 1.9 MG/DL (ref 1.8–2.4)
MCH RBC QN AUTO: 30.7 PG (ref 26–34)
MCHC RBC AUTO-ENTMCNC: 32.8 G/DL (ref 31–36)
MCV RBC AUTO: 93.6 FL (ref 80–100)
MONOCYTES ABSOLUTE: 0.4 K/UL (ref 0–1.3)
MONOCYTES RELATIVE PERCENT: 12.1 %
NEUTROPHILS ABSOLUTE: 2.2 K/UL (ref 1.7–7.7)
NEUTROPHILS RELATIVE PERCENT: 62.8 %
PDW BLD-RTO: 14.8 % (ref 12.4–15.4)
PERFORMED ON: ABNORMAL
PERFORMED ON: NORMAL
PHOSPHORUS: 3.2 MG/DL (ref 2.5–4.9)
PLATELET # BLD: 338 K/UL (ref 135–450)
PMV BLD AUTO: 8.1 FL (ref 5–10.5)
POTASSIUM SERPL-SCNC: 4.3 MMOL/L (ref 3.5–5.1)
RBC # BLD: 3.73 M/UL (ref 4.2–5.9)
SODIUM BLD-SCNC: 135 MMOL/L (ref 136–145)
WBC # BLD: 3.5 K/UL (ref 4–11)

## 2020-04-19 PROCEDURE — 6370000000 HC RX 637 (ALT 250 FOR IP): Performed by: INTERNAL MEDICINE

## 2020-04-19 PROCEDURE — 84100 ASSAY OF PHOSPHORUS: CPT

## 2020-04-19 PROCEDURE — 80048 BASIC METABOLIC PNL TOTAL CA: CPT

## 2020-04-19 PROCEDURE — 85025 COMPLETE CBC W/AUTO DIFF WBC: CPT

## 2020-04-19 PROCEDURE — 83036 HEMOGLOBIN GLYCOSYLATED A1C: CPT

## 2020-04-19 PROCEDURE — 2580000003 HC RX 258: Performed by: NURSE PRACTITIONER

## 2020-04-19 PROCEDURE — 2060000000 HC ICU INTERMEDIATE R&B

## 2020-04-19 PROCEDURE — 83735 ASSAY OF MAGNESIUM: CPT

## 2020-04-19 RX ADMIN — APIXABAN 5 MG: 5 TABLET, FILM COATED ORAL at 08:57

## 2020-04-19 RX ADMIN — METOPROLOL SUCCINATE 50 MG: 50 TABLET, EXTENDED RELEASE ORAL at 08:56

## 2020-04-19 RX ADMIN — ACETAMINOPHEN 650 MG: 325 TABLET ORAL at 21:06

## 2020-04-19 RX ADMIN — APIXABAN 5 MG: 5 TABLET, FILM COATED ORAL at 21:06

## 2020-04-19 RX ADMIN — SODIUM CHLORIDE, PRESERVATIVE FREE 10 ML: 5 INJECTION INTRAVENOUS at 08:57

## 2020-04-19 RX ADMIN — TAMSULOSIN HYDROCHLORIDE 0.4 MG: 0.4 CAPSULE ORAL at 08:56

## 2020-04-19 RX ADMIN — POLYVINYL ALCOHOL 1 DROP: 14 SOLUTION/ DROPS OPHTHALMIC at 21:07

## 2020-04-19 RX ADMIN — POLYVINYL ALCOHOL 1 DROP: 14 SOLUTION/ DROPS OPHTHALMIC at 08:57

## 2020-04-19 RX ADMIN — AMLODIPINE BESYLATE 2.5 MG: 5 TABLET ORAL at 08:56

## 2020-04-19 ASSESSMENT — PAIN SCALES - GENERAL
PAINLEVEL_OUTOF10: 0
PAINLEVEL_OUTOF10: 0
PAINLEVEL_OUTOF10: 4

## 2020-04-19 NOTE — PLAN OF CARE
Fall risk assessment completed every shift. All precautions in place. Pt has call light within reach at all times. Room clear of clutter. Pt has periods of confusion  and pulls off oxygen. PICC locked in right upper arm. Tele cam in room as patient isn't calling appropriately and desats quickly. Patient's respiratory status stable at this time. No cyanosis or s/s of respiratory distress noted. Respirations even, easy and regular. Oxygen in place per nasal cannula at 5L/min, SpO2 97%. Breath sounds as charted. Will continue to monitor.
Problem: Falls - Risk of:  Goal: Will remain free from falls  Description: Will remain free from falls  Outcome: Ongoing  Goal: Absence of physical injury  Description: Absence of physical injury  Outcome: Ongoing     Problem: Discharge Planning:  Goal: Discharged to appropriate level of care  Description: Discharged to appropriate level of care  Outcome: Ongoing  Goal: Participates in care planning  Description: Participates in care planning  Outcome: Ongoing     Problem: Airway Clearance - Ineffective:  Goal: Clear lung sounds  Description: Clear lung sounds  Outcome: Ongoing  Goal: Ability to maintain a clear airway will improve  Description: Ability to maintain a clear airway will improve  Outcome: Ongoing     Problem: Fluid Volume - Deficit:  Goal: Achieves intake and output within specified parameters  Description: Achieves intake and output within specified parameters  Outcome: Ongoing     Problem: Gas Exchange - Impaired:  Goal: Levels of oxygenation will improve  Description: Levels of oxygenation will improve  Outcome: Ongoing     Problem: Hyperthermia:  Goal: Ability to maintain a body temperature in the normal range will improve  Description: Ability to maintain a body temperature in the normal range will improve  Outcome: Ongoing     Problem: Tobacco Use:  Goal: Will participate in inpatient tobacco-use cessation counseling  Description: Will participate in inpatient tobacco-use cessation counseling  Outcome: Ongoing
Problem: Pain:  Goal: Pain level will decrease  Description: Pain level will decrease  4/15/2020 2132 by Polo Ernandez RN  Outcome: Ongoing  4/15/2020 1614 by Arsh Faith RN  Outcome: Ongoing  4/15/2020 0806 by Alexia Nance RN  Outcome: Ongoing  4/15/2020 0804 by Alexia Nance RN  Outcome: Ongoing  Goal: Control of acute pain  Description: Control of acute pain  4/15/2020 2132 by Polo Ernandez RN  Outcome: Ongoing  4/15/2020 1614 by Arsh Faith RN  Outcome: Ongoing  4/15/2020 0806 by Alexia Nance RN  Outcome: Ongoing  4/15/2020 0804 by Alexia Nance RN  Outcome: Ongoing  Goal: Control of chronic pain  Description: Control of chronic pain  4/15/2020 2132 by Polo Ernandez RN  Outcome: Ongoing  4/15/2020 1614 by Arsh Faith RN  Outcome: Ongoing  4/15/2020 0806 by Alexia Nance RN  Outcome: Ongoing  4/15/2020 0804 by Alexia Nance RN  Outcome: Ongoing
Problem: Pain:  Goal: Pain level will decrease  Description: Pain level will decrease  4/16/2020 2029 by Lobo Croft RN  Outcome: Ongoing  4/16/2020 1757 by Nickolas Roberts RN  Outcome: Ongoing  Goal: Control of acute pain  Description: Control of acute pain  4/16/2020 2029 by Lobo Croft RN  Outcome: Ongoing  4/16/2020 1757 by Nickolas Roberts RN  Outcome: Ongoing  Goal: Control of chronic pain  Description: Control of chronic pain  4/16/2020 2029 by Lobo Croft RN  Outcome: Ongoing  4/16/2020 1757 by Nickolas Roberts RN  Outcome: Ongoing
Problem: Pain:  Goal: Pain level will decrease  Description: Pain level will decrease  Outcome: Ongoing  Goal: Control of acute pain  Description: Control of acute pain  Outcome: Ongoing  Goal: Control of chronic pain  Description: Control of chronic pain  Outcome: Ongoing     Problem: Falls - Risk of:  Goal: Will remain free from falls  Description: Will remain free from falls  Outcome: Ongoing  Goal: Absence of physical injury  Description: Absence of physical injury  Outcome: Ongoing     Problem: Discharge Planning:  Goal: Discharged to appropriate level of care  Description: Discharged to appropriate level of care  Outcome: Ongoing  Goal: Participates in care planning  Description: Participates in care planning  Outcome: Ongoing     Problem: Airway Clearance - Ineffective:  Goal: Clear lung sounds  Description: Clear lung sounds  Outcome: Ongoing  Goal: Ability to maintain a clear airway will improve  Description: Ability to maintain a clear airway will improve  Outcome: Ongoing     Problem: Fluid Volume - Deficit:  Goal: Achieves intake and output within specified parameters  Description: Achieves intake and output within specified parameters  Outcome: Ongoing     Problem: Gas Exchange - Impaired:  Goal: Levels of oxygenation will improve  Description: Levels of oxygenation will improve  Outcome: Ongoing     Problem: Hyperthermia:  Goal: Ability to maintain a body temperature in the normal range will improve  Description: Ability to maintain a body temperature in the normal range will improve  Outcome: Ongoing     Problem: Tobacco Use:  Goal: Will participate in inpatient tobacco-use cessation counseling  Description: Will participate in inpatient tobacco-use cessation counseling  Outcome: Ongoing
Pt able to express presence/absence of pain and rate pain appropriately using numerical scale. Pain/discomfort being managed with PRN analgesics per MD orders (see MAR). Pain assessed every shift and after interventions. Patient free from falls this shift. Fall precautions in place at all times. Bed in lowest position with two side rails up and wheels locked. Call light within reach. Patient able and agreeable to contact for safety appropriately. Continuing to work with patient and health care team on discharge plan. Discharge instructions and medication management will be reviewed prior to discharge.     Problem: Fluid Volume - Deficit:  Goal: Achieves intake and output within specified parameters  Description: Achieves intake and output within specified parameters  Outcome: Ongoing     Problem: Hyperthermia:  Goal: Ability to maintain a body temperature in the normal range will improve  Description: Ability to maintain a body temperature in the normal range will improve  Outcome: Ongoing     Problem: Tobacco Use:  Goal: Will participate in inpatient tobacco-use cessation counseling  Description: Will participate in inpatient tobacco-use cessation counseling  Outcome: Ongoing
Review of pacer reveals minutes of afib after 4/11/20 prolonged episode. He had only a minute prior to admission since pacer implant. The safest thing is to put him on oral anticoagulation if no contraindication until it is clear that afib has settled down from illness. He follows at [facility] which is fine, but let us know if we can help. Please call with any questions.
Ineffective:  Goal: Clear lung sounds  Description: Clear lung sounds  4/11/2020 1501 by Sanket Hatch RN  Outcome: Ongoing  Note: Lung sounds are diminished. Problem: Airway Clearance - Ineffective:  Goal: Ability to maintain a clear airway will improve  Description: Ability to maintain a clear airway will improve  4/11/2020 1501 by Sanket Hatch RN  Outcome: Ongoing  Note: No cough noted. Respirations are tachypnic but no acute distress. Will continue to   Problem: Tobacco Use:  Goal: Will participate in inpatient tobacco-use cessation counseling  Description: Will participate in inpatient tobacco-use cessation counseling  Outcome: Ongoing  Note: .     x is staying greater than 95%. Problem: Hyperthermia:  Goal: Ability to maintain a body temperature in the normal range will improve  Description: Ability to maintain a body temperature in the normal range will improve  4/11/2020 1501 by Sanket Hatch RN  Outcome: Ongoing  Note: Pt has been running a low grade temperature.
cluster care as much as possible and allow for adequate rest. RN turned TV off, closed blinds, and turned off lights to allow for good sleeping environment. Pt allowed to rest uninterrupted from approximately 2792-5035.

## 2020-04-19 NOTE — PROGRESS NOTES
Hospitalist Progress Note    CC: Pneumonia due to COVID-19 virus    Hospital course:  79-year-old gentleman with a history of syncope due to Mobitz type II status post dual PPM 5/2015, afib, DMII, hypertension Was admitted on 4/10 for shortness of breath and fever.  Patient is COVID-19 positive.  Completed 5 days of hydroxychloroquine and azithromycin. Family now refusing to take pt home since they are in quarantine - however they already lived together prior to this. Since pt not a citizen he is private pay for SNF which pt family refusing to do. He had acute metabolic encephalopathy POA which is improving. Now with acute resp failure with hypoxia slowly resolving. Daughter now says she is out of town until next week, so he will have to remain in the hospital.  PT may need to be discharged to homeless shelter. Pt is from [facility] and language is a barrier. Admit date: 4/10/2020  Days in hospital:  9    24 Hour Events: family still refusing to take him home    Subjective: sitting in chair - appears stable     ROS:   Review of systems not obtained due to patient factors. language barrier, but denies pain by gesturing. Objective:    /68   Pulse 82   Temp 98.5 °F (36.9 °C) (Oral)   Resp 18   Ht 5' 11\" (1.803 m)   Wt 209 lb 14.1 oz (95.2 kg)   SpO2 100%   BMI 29.27 kg/m²     Physical exam:        ( based on new provisions and guidance offered by [facility] on March 18, 2020 in setting of COVID 19 outbreak and in order to preserve personal protective equipment in accordance with the flexibilities announced by CMS on March 30, 2020)   References:   https://med. [place].gov/Portals/0/Resources/COVID-19/3_18%20Telemed%20Guidance%20Updated%20March%2018. pdf?okk=7286-32-31-302352-861   ?????   ????????????????????http://[employer]/. pdf   ?????  Bedside physical examination deferred. succinate  50 mg Oral Daily    tamsulosin  0.4 mg Oral Daily       PRN Meds:  sodium chloride flush, glucose, dextrose, glucagon (rDNA), dextrose, ketotifen, polyvinyl alcohol, acetaminophen **OR** acetaminophen, polyethylene glycol, promethazine **OR** ondansetron    IV:   dextrose         No intake or output data in the 24 hours ending 04/19/20 1134    Results:  CBC:   Recent Labs     04/17/20  0545 04/18/20  0507 04/19/20  1000   WBC 3.4* 3.3* 3.5*   HGB 11.4* 11.6* 11.4*   HCT 34.2* 35.0* 34.9*   MCV 92.9 93.1 93.6    325 338     BMP:   Recent Labs     04/17/20  0545 04/18/20  0507 04/19/20  1000    140 135*   K 3.8 3.8 4.3    104 100   CO2 23 24 25   PHOS 3.0 3.0 3.2   BUN 16 13 15   CREATININE 0.8 0.8 0.9     Mag: No results for input(s): MAG in the last 72 hours. Phos:   Lab Results   Component Value Date    PHOS 3.2 04/19/2020     No components found for: GLU    LIVER PROFILE: No results for input(s): AST, ALT, LIPASE, BILIDIR, BILITOT, ALKPHOS in the last 72 hours. Invalid input(s): AMYLASE,  ALB  PT/INR: No results for input(s): PROTIME, INR in the last 72 hours. APTT: No results for input(s): APTT in the last 72 hours. UA:No results for input(s): NITRITE, COLORU, PHUR, LABCAST, WBCUA, RBCUA, MUCUS, TRICHOMONAS, YEAST, BACTERIA, CLARITYU, SPECGRAV, LEUKOCYTESUR, UROBILINOGEN, BILIRUBINUR, BLOODU, GLUCOSEU, AMORPHOUS in the last 72 hours.     Invalid input(s): Dustin Torres input(s): ABG  Lab Results   Component Value Date    CALCIUM 9.1 04/19/2020    PHOS 3.2 04/19/2020               Electronically signed by Cynthia Andrew MD on 4/19/2020 at 11:34 AM

## 2020-04-20 VITALS
OXYGEN SATURATION: 98 % | WEIGHT: 209.88 LBS | TEMPERATURE: 98.6 F | RESPIRATION RATE: 18 BRPM | DIASTOLIC BLOOD PRESSURE: 74 MMHG | HEIGHT: 71 IN | SYSTOLIC BLOOD PRESSURE: 110 MMHG | BODY MASS INDEX: 29.38 KG/M2 | HEART RATE: 77 BPM

## 2020-04-20 LAB
ESTIMATED AVERAGE GLUCOSE: 134.1 MG/DL
GLUCOSE BLD-MCNC: 103 MG/DL (ref 70–99)
GLUCOSE BLD-MCNC: 165 MG/DL (ref 70–99)
HBA1C MFR BLD: 6.3 %
PERFORMED ON: ABNORMAL
PERFORMED ON: ABNORMAL

## 2020-04-20 PROCEDURE — 2580000003 HC RX 258: Performed by: NURSE PRACTITIONER

## 2020-04-20 PROCEDURE — 6370000000 HC RX 637 (ALT 250 FOR IP): Performed by: NURSE PRACTITIONER

## 2020-04-20 PROCEDURE — 97116 GAIT TRAINING THERAPY: CPT

## 2020-04-20 PROCEDURE — 6370000000 HC RX 637 (ALT 250 FOR IP): Performed by: INTERNAL MEDICINE

## 2020-04-20 PROCEDURE — 97535 SELF CARE MNGMENT TRAINING: CPT

## 2020-04-20 PROCEDURE — 97530 THERAPEUTIC ACTIVITIES: CPT

## 2020-04-20 RX ADMIN — TAMSULOSIN HYDROCHLORIDE 0.4 MG: 0.4 CAPSULE ORAL at 08:47

## 2020-04-20 RX ADMIN — METOPROLOL SUCCINATE 50 MG: 50 TABLET, EXTENDED RELEASE ORAL at 08:47

## 2020-04-20 RX ADMIN — SODIUM CHLORIDE, PRESERVATIVE FREE 10 ML: 5 INJECTION INTRAVENOUS at 08:50

## 2020-04-20 RX ADMIN — INSULIN LISPRO 1 UNITS: 100 INJECTION, SOLUTION INTRAVENOUS; SUBCUTANEOUS at 11:39

## 2020-04-20 RX ADMIN — APIXABAN 5 MG: 5 TABLET, FILM COATED ORAL at 08:47

## 2020-04-20 RX ADMIN — AMLODIPINE BESYLATE 2.5 MG: 5 TABLET ORAL at 08:47

## 2020-04-20 ASSESSMENT — PAIN SCALES - GENERAL: PAINLEVEL_OUTOF10: 0

## 2020-04-20 NOTE — PROGRESS NOTES
[facility]    Respiratory Therapy   Home Oxygen Evaluation        Name: Melinda Blanco  Medical Record Number: [id number]  Age: 78 y.o. Gender:  male   : 1940  Today's date: 2020  Room: P8T-6590/5263-01      Assessment        /74   Pulse 77   Temp 98.6 °F (37 °C) (Oral)   Resp 18   Ht 5' 11\" (1.803 m)   Wt 209 lb 14.1 oz (95.2 kg)   SpO2 98%   BMI 29.27 kg/m²     Patient Active Problem List   Diagnosis    Multifocal pneumonia    Hyponatremia    Dual chamber pacemaker in place    Acute respiratory failure with hypoxemia ([address]    COVID-19    Pneumonia due to COVID-19 virus    PA[facility]oxysmal atrial fibrillation) ([facility])       Social History:  Social History     Tobacco Use    Smoking status: Never Smoker    Smokeless tobacco: Never Used    Tobacco comment: Pt has language barrier and is mildly confused. When asked if smoked or used smokeless tobacco, he said no. When asked if ever smoked or used smokeless tobacoo, he didn't respond. Substance Use Topics    Alcohol use: Not on file    Drug use: Not on file       Patient Room Air saturation at rest 92%  Patient Room Air saturation upon ambulation 90%      Patient resting on 2  lmp  with an oxygen saturation of  95%     Patient ambulated on 0 lpm with an oxygen saturation of 90%    Patient resting on 0 lmp  with an oxygen saturation of  95%         In your clinical assessment does the Patient Require Portable Oxygen Tanks?     No               Patient/caregiver was educated on Home Oxygen process:  Yes      Level of patient/caregiver understanding able to:   [] Verbalize understanding   [] Demonstrate understanding       [] Teach back        [] Needs reinforcement        []  No available caregiver               []  Other:     Response to education:  Fair     Time Spent with Home O2 Set Up:  10  minutes     Stephen Loera RCP on 2020 at 1:15 PM

## 2020-04-20 NOTE — PROGRESS NOTES
Physical Therapy  Facility/Department: [address] PROGRESSIVE CARE  Daily Treatment Note - COTX    NAME: Marcela Zhang  : 1940  MRN: [id number]    Date of Service: 2020    Discharge Recommendations:  Patient would benefit from continued therapy after discharge, Home with Home health PT, 24 hour supervision or assist   PT Equipment Recommendations  Equipment Needed: No    Assessment   Body structures, Functions, Activity limitations: Decreased functional mobility ; Decreased safe awareness;Decreased cognition;Decreased endurance  Assessment: Pt very pleasant and agreeable to therapy. He continues to require max cueing to initiate tasks, and demonstrates poor safety awareness regarding his O2 line, but was able to complete tasks without physical assist today. He would benefit from continued therapy to improve his balance, strength, and safety awareness. Recommend return home with 24/7 assist from family d/t cognitive deficits (chronic?). Marcela Zhang scored a 18/24 on the AM-PAC short mobility form. See above for discharge recommendations. If patient discharges prior to next session this note will serve as a discharge summary. Please see below for the latest assessment towards goals. Patient Education: Role of PT, POC, Need to call for assist.    Activity Tolerance  Activity Tolerance: Patient limited by cognitive status     Patient Diagnosis(es): The primary encounter diagnosis was Pneumonia due to organism. Diagnoses of Suspected COVID-19 virus infection, Fever, unspecified fever cause, Shortness of breath, Cough, Elevated d-dimer, Elevated LDH, Elevated C-reactive protein (CRP), Elevated procalcitonin, and Viral illness were also pertinent to this visit. has a past medical history of Hypertension. has a past surgical history that includes Pacemaker insertion.     Restrictions  Restrictions/Precautions  Restrictions/Precautions: Fall Risk  Position Activity Restriction  Other position/activity restrictions: Contact/Droplet : + COVID 19. Subjective   Subjective  Subjective: Pt pleasantly confused. Agreeable to therapy. Requires max cues to initiate tasks. Orientation  Orientation  Overall Orientation Status: Impaired  Orientation Level: Oriented to person;Disoriented to place; Disoriented to time;Disoriented to situation    Objective      Bed mobility  Supine to Sit: Stand by assistance(With cues to initiate)  Sit to Supine: Stand by assistance(With cues to initiate)     Transfers  Sit to Stand: Stand by assistance  Stand to sit: Stand by assistance     Ambulation  Ambulation?: Yes  Ambulation 1  Surface: level tile  Device: No Device  Assistance: Stand by assistance  Quality of Gait: Pt required close SBA d/t intermittent trunk lean to R, but was able to maintain balance without physical assist today, ambulating several trials in room without AD. He showed little awareness of his O2 line, requiring assist from PT/OT to manage this. Distance: 10', 50', 5' x 2    AM-PAC Score  AM-PAC Inpatient Mobility Raw Score : 18 (04/20/20 0950)  AM-PAC Inpatient T-Scale Score : 43.63 (04/20/20 0950)  Mobility Inpatient CMS 0-100% Score: 46.58 (04/20/20 0950)  Mobility Inpatient CMS G-Code Modifier : CK (04/20/20 0950)          Goals  Short term goals  Time Frame for Short term goals: Upon d/c acute care setting. Short term goal 1: Bed Mob Supervision. - ongoing 4/20  Short term goal 2: Transfers with/without assist device SBA/Supervision. - ongoing 4/20  Short term goal 3: Amb with/without assist device 50' SBA. - met 4/20 - progress to ambulating 100' with Supervision  Patient Goals   Patient goals : None stated. Plan    Plan  Times per week: 3-5x week while in acute care setting.     Current Treatment Recommendations: Functional Mobility Training, Transfer Training, Gait Training, Safety Education & Training, Patient/Caregiver Education & Training  Safety Devices  Type of

## 2020-04-20 NOTE — PROGRESS NOTES
Discharge orders acknowledged by RN . Discharge teaching completed with pt and family. AVS reviewed and all questions answered. New prescriptions and current medication regimen reviewed and pt understands schedule. Pt given medications from [facility] with Meds to [place]. Follow up appointments also reviewed with pt and resources given for discharge. Pt was given written prescriptions to be filled and understands schedule. PICC removed, pressure held for 5 minutes, dressing placed on pt. It is clean, dry, and intact. Telemonitor removed and returned to [facility]. All education completed. Required core measures completed. Pt vitals WDL. Pt discharged with all belongings to Home. Pt transported off of unit via wheelchair. No complications. Discharge paper work was given toand reviewed with pt daughter. Pt verbalized understanding of all education provided about discharge, home isolation, medications, and to follow up with PCP. All questions answered, no complications.        Electronically signed by Francisco Kwon RN on 4/20/2020 at 2:28 PM

## 2020-04-20 NOTE — PROGRESS NOTES
Occupational Therapy  Facility/Department: [address] PROGRESSIVE CARE  Daily Treatment Note and Tentative D/C    NAME: Martin Matos  : 1940  MRN: [id number]    Date of Service: 2020    Discharge Recommendations: Martin Matos scored a 21/24 on the AM-PAC ADL Inpatient form. Current research shows that an AM-PAC score of 17 or less is typically not associated with a discharge to the patient's home setting. Based on the patients AM-PAC score and their current ADL deficits, it is recommended that the patient have 3-5 sessions per week of Occupational Therapy at d/c to increase the patients independence. If patient discharges prior to next session this note will serve as a discharge summary. Please see below for the latest assessment towards goals. Continue to assess pending progress, 24 hour supervision or assist, 2-3 sessions per week  OT Equipment Recommendations  Other: continue to assess    Assessment   Performance deficits / Impairments: Decreased functional mobility ; Decreased strength;Decreased endurance;Decreased ADL status; Decreased safe awareness;Decreased balance;Decreased high-level IADLs;Decreased cognition  Assessment: Pt tolerated session well. Pt completes functional mobility and transfers with close SBA and no device. Pt completes grooming in stance at sink with SBA and LB dressing with SBA this date. Pt continues to need cues for sequencing tasks although unsure if due to language barrier. Continue with POC. Prognosis: Good  Exam: see above  OT Education: OT Role;Plan of Care  REQUIRES OT FOLLOW UP: Yes  Activity Tolerance  Activity Tolerance: Patient Tolerated treatment well  Safety Devices  Safety Devices in place: Yes  Type of devices: Chair alarm in place;Call light within reach;Nurse notified; Left in chair         Patient Diagnosis(es): The primary encounter diagnosis was Pneumonia due to organism.  Diagnoses of Suspected COVID-19 virus infection, Fever, unspecified fever cause, Shortness of breath, Cough, Elevated d-dimer, Elevated LDH, Elevated C-reactive protein (CRP), Elevated procalcitonin, and Viral illness were also pertinent to this visit. has a past medical history of Hypertension. has a past surgical history that includes Pacemaker insertion. Restrictions  Restrictions/Precautions  Restrictions/Precautions: Fall Risk  Position Activity Restriction  Other position/activity restrictions: Contact/Droplet : + COVID 19. Subjective   General  Chart Reviewed: Yes  Patient assessed for rehabilitation services?: Yes  Additional Pertinent Hx: per ED note, Rolando Barton is a 78 y.o. male who presents the ED today with covid-19-like symptoms. They have been ongoing for the past couple of days according to the patient and family member. Including elevated temperature at home, shortness of breath and cough. Cough is nonproductive. He has generalized weakness and body aches. No abdominal pain, nausea vomiting or diarrhea. States that he is not diabetic or immunocompromise. Came to the ED for further evaluation and treatment as he was seen at [facility] yesterday, was diagnosed with pneumonia and given antibiotics but he states that he is been feeling worse over the past 24 hours. Family / Caregiver Present: No  Referring Practitioner: Chip Sanders MD  Subjective  Subjective: Pt supine in bed upon arrival and agreeable to OT treatment. Pt with no reports of pain. Pt on 2L O2 throughout session. General Comment  Comments: okay for therapy per RN. Orientation  Orientation  Overall Orientation Status: Impaired(difficulty assessing)  Orientation Level: Oriented to person  Objective    ADL  Grooming: Stand by assistance(in stance at sink to wash hands)  LE Dressing: Stand by assistance(seated in chair to don/doff bilateral socks)        Balance  Sitting Balance: Supervision  Standing Balance: Stand by assistance  Functional Mobility  Activity: To/from bathroom; Other(short distnaces in room)  Assist Level: Stand by assistance(close SBA)  Functional Mobility Comments: no overt LOB  Toilet Transfers  Toilet - Technique: Ambulating  Equipment Used: Grab bars  Toilet Transfer: Stand by assistance  Wheelchair Bed Transfers  Wheelchair/Bed - Technique: Ambulating  Equipment Used: Bed;Other(bed to chair)  Level of Asssistance: Stand by assistance  Bed mobility  Supine to Sit: Stand by assistance(With cues to initiate)  Sit to Supine: Stand by assistance(With cues to initiate)  Transfers  Sit to stand: Stand by assistance  Stand to sit: Stand by assistance                       Cognition  Overall Cognitive Status: Exceptions  Following Commands: Follows one step commands with repetition; Follows one step commands with increased time; Inconsistently follows commands  Attention Span: Difficulty attending to directions; Difficulty dividing attention  Memory: Decreased recall of biographical Information;Decreased recall of recent events  Safety Judgement: Decreased awareness of need for assistance  Problem Solving: Assistance required to generate solutions;Assistance required to implement solutions;Assistance required to identify errors made  Insights: Decreased awareness of deficits  Initiation: Requires cues for some  Sequencing: Requires cues for some           Plan   Plan  Times per week: 2-3x  Current Treatment Recommendations: Strengthening, Functional Mobility Training, Gait Training, Endurance Training, Balance Training, Self-Care / ADL, Safety Education & Training, Equipment Evaluation, Education, & procurement, Patient/Caregiver Education & Training, Cognitive Reorientation    AM-PAC Score        AM-PAC Inpatient Daily Activity Raw Score: 21 (04/20/20 0954)  AM-PAC Inpatient ADL T-Scale Score : 44.27 (04/20/20 0954)  ADL Inpatient CMS 0-100% Score: 32.79 (04/20/20 0954)  ADL Inpatient CMS G-Code Modifier : Lisa Doss (04/20/20 9589)    Goals  Short term goals  Time Frame for Short term goals:

## 2020-04-20 NOTE — CARE COORDINATION
Spoke w/ Malinda Fung at [facility] this am  She will have admin review record for possible placement there as private pay  Electronically signed by Donald Lazo RN on 4/20/2020 at 9:39 AM

## 2020-04-20 NOTE — CARE COORDINATION
Protracted conversation w/ son and daughter  Provided cost for [employer] as private pay  They kept asking repeatedly Marley Baker does he need\" I explained a place to rest, meals and possibly oxygen but would require retesting  They asked what a SNF would do and I explained room and board and oxygen  They wanted to know why the doctor offered a SNF since he had no insurance that would cover it  I explained that MD would not know that pt did not have a payor source and that we had discussed private pay at SNF last Friday when he was dc'd  They asked again what he needs at home when I explained that it was the same answer that I have been giving them since Friday they began yelling that I wasn't doing my job  They asked what sort of followup he needs for oxygen and I said to see PCP at [facility] to determine length of treatment  They were upset that he would not receive follow up at [facility]. I reminded that I had given referral line number to arrange pulmonary and cardiology appts should they wish to be seen here but would serve pt best to have all MD's at same hospital system  They requested that we send pt home w/ \"all the PPE\" I explained that we could perhaps send a \"few masks\" but that daughter has already been exposed  Suggested that pt remain at home for 2 weeks  They then said that they will not want SNF and will take him home   They are requesting a call after repeat home O2 eval is completed and then they will decide when they will pick him up  Updated pts nurse  Therapy suggesting walker. Spoke w/ Marry Quinn at [facility].  It will be 45.00 she will need RW and O2 orders    Electronically signed by Kim Vaughn RN on 4/20/2020 at 12:47 PM    Pt did not qualify for O2  PT eval indicated no need for RW  Updated Marry Quinn at [facility] daughter who will  in a hour  Updated nurse  Electronically signed by Kim Vaughn RN on 4/20/2020 at 1:27 PM      updated Dani Ramirez at [facility] and Jim at [facility]  Electronically signed by Bandar Morales RN on 4/20/2020 at 1:29 PM

## 2020-04-21 ENCOUNTER — CARE COORDINATION (OUTPATIENT)
Dept: CASE MANAGEMENT | Age: 80
End: 2020-04-21

## 2020-04-21 NOTE — DISCHARGE SUMMARY
Hospital Medicine Discharge Summary    Patient ID: Cj Pedroza      Patient's PCP: No primary care provider on file. Admit Date: 4/10/2020     Discharge Date:   4/17/20    Admitting Physician: Nelson Nettles MD     Discharge Physician: Sharifa Parada MD     Discharge Diagnoses: Active Hospital Problems    Diagnosis Date Noted    PAF (paroxysmal atrial fibrillation) ([facility] [address] [I48.0] 04/15/2020    COVID-19 [U07.1, J98.8]     Pneumonia due to COVID-19 virus [U07.1, J12.89]     Acute respiratory failure with hypoxemia ([facility] [address] [J96.01] 04/11/2020    Suspected 2019 novel coronavirus infection [R68.89] 04/10/2020    Multifocal pneumonia [J18.9] 04/10/2020    Hyponatremia [E87.1] 04/10/2020    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 Patient is COVID-19 positive. Completed 5 days of hydroxychloroquine and azithromycin. COVID-19 pneumonia  Monitor QTC, status post azithromycin, Plaquenil course  Pulmonary following, wean oxygen as tolerated    Acute hypoxic respiratory failure secondary to above  Pulmonary following, wean oxygen as tolerated  Requires 2L at DC    A. fib, status post PPM for Mobitz type II  Metoprolol and elqiuis  Follows at     Acute metabolic encephalopathy  Resolved    Diabetes mellitus  lantus and ssi  Carb control diet    Hypertension  norvasc    BPH  flomax    Patient is stable for discharge and he is evaluated for the need for home oxygen. He does require 2 L oxygen as he desatted to 86% on room air with ambulation. The patient is excited to go home and family was updated. He lives at home with 1 daughter.   When
/74   Pulse 77   Temp 98.6 °F (37 °C) (Oral)   Resp 18   Ht 5' 11\" (1.803 m)   Wt 209 lb 14.1 oz (95.2 kg)   SpO2 98%   BMI 29.27 kg/m²     ( based on new provisions and guidance offered by [facility] on March 18, 2020 in setting of COVID 19 outbreak and in order to preserve personal protective equipment in accordance with the flexibilities announced by CMS on March 30, 2020)   References:   [email or web address]/Resources/COVID-[email or web address]/3_18%20Telemed%20Guidance%20Updated%20March%2018. pdf?[email or web address]   ?????   ????????????????????[email or web address]/. pdf   ?????  Bedside physical examination deferred. However I did visually inspect the patient and observed the following:  ????????????????? General: vitals reviewed, alert, comfortable? HEENT:?NCAT  Cardiovascular: deferred, telemetry data reviewed Yes  Pulmonary:?normal chest excursion  Abdomen/GI: deferred   Neuro:  Gait normal.weak overall  Skin: no rashes or significant lesions  Musculoskeletal: ?deferred   Genitourinary: Fallon catheter No  Psych: alert and oriented  Lymphatic/Immunologic: deferred     Labs: For convenience and continuity at follow-up the following most recent labs are provided:      CBC:    Lab Results   Component Value Date    WBC 3.5 04/19/2020    HGB 11.4 04/19/2020    HCT 34.9 04/19/2020     04/19/2020       Renal:    Lab Results   Component Value Date     04/19/2020    K 4.3 04/19/2020    K 4.3 04/11/2020     04/19/2020    CO2 25 04/19/2020    BUN 15 04/19/2020    CREATININE 0.9 04/19/2020    CALCIUM 9.1 04/19/2020    PHOS 3.2 04/19/2020         Significant Diagnostic Studies    Radiology:   XR CHEST 1 VW   Final Result   No change bilateral airspace disease representing multifocal infection.          XR CHEST PORTABLE   Final Result   Patchy multifocal airspace opacities are noted bilaterally without   significant

## 2020-04-23 ENCOUNTER — CARE COORDINATION (OUTPATIENT)
Dept: CASE MANAGEMENT | Age: 80
End: 2020-04-23

## 2020-05-05 ENCOUNTER — CARE COORDINATION (OUTPATIENT)
Dept: CASE MANAGEMENT | Age: 80
End: 2020-05-05

## 2024-06-17 ENCOUNTER — LAB SERVICES (OUTPATIENT)
Dept: LAB | Age: 84
End: 2024-06-17

## 2024-06-17 DIAGNOSIS — Z13.1 SCREENING FOR DIABETES MELLITUS (DM): ICD-10-CM

## 2024-06-17 DIAGNOSIS — K43.9 VENTRAL HERNIA WITHOUT OBSTRUCTION OR GANGRENE: ICD-10-CM

## 2024-06-17 PROBLEM — Z95.0 CARDIAC PACEMAKER: Status: ACTIVE | Noted: 2024-06-17

## 2024-06-17 PROBLEM — I10 PRIMARY HYPERTENSION: Status: ACTIVE | Noted: 2024-06-17

## 2024-06-17 LAB
ANION GAP SERPL CALC-SCNC: 9 MMOL/L (ref 7–19)
BUN SERPL-MCNC: 23 MG/DL (ref 6–20)
BUN/CREAT SERPL: 19 (ref 7–25)
CALCIUM SERPL-MCNC: 9.4 MG/DL (ref 8.4–10.2)
CHLORIDE SERPL-SCNC: 111 MMOL/L (ref 97–110)
CO2 SERPL-SCNC: 25 MMOL/L (ref 21–32)
CREAT SERPL-MCNC: 1.19 MG/DL (ref 0.67–1.17)
EGFRCR SERPLBLD CKD-EPI 2021: 61 ML/MIN/{1.73_M2}
FASTING DURATION TIME PATIENT: 12 HOURS (ref 0–999)
GLUCOSE SERPL-MCNC: 86 MG/DL (ref 70–99)
HBA1C MFR BLD: 5.5 % (ref 4.5–5.6)
POTASSIUM SERPL-SCNC: 4.2 MMOL/L (ref 3.4–5.1)
SODIUM SERPL-SCNC: 141 MMOL/L (ref 135–145)

## 2024-06-17 PROCEDURE — 80048 BASIC METABOLIC PNL TOTAL CA: CPT | Performed by: CLINICAL MEDICAL LABORATORY

## 2024-06-17 PROCEDURE — 83036 HEMOGLOBIN GLYCOSYLATED A1C: CPT | Performed by: CLINICAL MEDICAL LABORATORY

## 2024-06-17 PROCEDURE — 36415 COLL VENOUS BLD VENIPUNCTURE: CPT | Performed by: STUDENT IN AN ORGANIZED HEALTH CARE EDUCATION/TRAINING PROGRAM

## 2024-06-21 ENCOUNTER — TELEPHONE (OUTPATIENT)
Dept: MAMMOGRAPHY | Age: 84
End: 2024-06-21

## 2024-06-24 DIAGNOSIS — R79.89 ELEVATED SERUM CREATININE: Primary | ICD-10-CM

## 2024-06-25 ENCOUNTER — OFFICE VISIT (OUTPATIENT)
Dept: SURGERY | Age: 84
End: 2024-06-25

## 2024-06-25 VITALS
HEART RATE: 63 BPM | DIASTOLIC BLOOD PRESSURE: 64 MMHG | WEIGHT: 191.25 LBS | HEIGHT: 71 IN | SYSTOLIC BLOOD PRESSURE: 104 MMHG | BODY MASS INDEX: 26.77 KG/M2

## 2024-06-25 DIAGNOSIS — K43.9 VENTRAL HERNIA WITHOUT OBSTRUCTION OR GANGRENE: Primary | ICD-10-CM

## 2024-06-25 PROCEDURE — 3078F DIAST BP <80 MM HG: CPT | Performed by: SURGERY

## 2024-06-25 PROCEDURE — 99243 OFF/OP CNSLTJ NEW/EST LOW 30: CPT | Performed by: SURGERY

## 2024-06-25 PROCEDURE — 3074F SYST BP LT 130 MM HG: CPT | Performed by: SURGERY

## 2024-06-25 ASSESSMENT — ENCOUNTER SYMPTOMS
EYES NEGATIVE: 1
ENDOCRINE NEGATIVE: 1
RESPIRATORY NEGATIVE: 1
GASTROINTESTINAL NEGATIVE: 1
ALLERGIC/IMMUNOLOGIC NEGATIVE: 1
CONSTITUTIONAL NEGATIVE: 1
PSYCHIATRIC NEGATIVE: 1
HEMATOLOGIC/LYMPHATIC NEGATIVE: 1
NEUROLOGICAL NEGATIVE: 1

## 2024-06-25 ASSESSMENT — PAIN SCALES - GENERAL: PAINLEVEL: 0

## 2024-07-01 ENCOUNTER — TELEPHONE (OUTPATIENT)
Dept: SURGERY | Age: 84
End: 2024-07-01

## 2024-10-15 ENCOUNTER — APPOINTMENT (OUTPATIENT)
Dept: FAMILY MEDICINE | Age: 84
End: 2024-10-15

## 2024-10-16 ENCOUNTER — OFFICE VISIT (OUTPATIENT)
Dept: FAMILY MEDICINE | Age: 84
End: 2024-10-16

## 2024-10-16 VITALS
RESPIRATION RATE: 18 BRPM | DIASTOLIC BLOOD PRESSURE: 78 MMHG | HEART RATE: 64 BPM | WEIGHT: 193.78 LBS | BODY MASS INDEX: 27.13 KG/M2 | TEMPERATURE: 97.4 F | SYSTOLIC BLOOD PRESSURE: 132 MMHG | HEIGHT: 71 IN

## 2024-10-16 DIAGNOSIS — K43.9 VENTRAL HERNIA WITHOUT OBSTRUCTION OR GANGRENE: ICD-10-CM

## 2024-10-16 DIAGNOSIS — Z23 NEED FOR COVID-19 VACCINE: ICD-10-CM

## 2024-10-16 DIAGNOSIS — H04.123 DRY EYE SYNDROME OF BOTH EYES: ICD-10-CM

## 2024-10-16 DIAGNOSIS — I10 PRIMARY HYPERTENSION: Primary | ICD-10-CM

## 2024-10-16 DIAGNOSIS — N40.1 BENIGN PROSTATIC HYPERPLASIA WITH LOWER URINARY TRACT SYMPTOMS, SYMPTOM DETAILS UNSPECIFIED: ICD-10-CM

## 2024-10-16 DIAGNOSIS — Z23 NEED FOR INFLUENZA VACCINATION: ICD-10-CM

## 2024-10-16 PROCEDURE — 90662 IIV NO PRSV INCREASED AG IM: CPT | Performed by: FAMILY MEDICINE

## 2024-10-16 PROCEDURE — 90480 ADMN SARSCOV2 VAC 1/ONLY CMP: CPT | Performed by: FAMILY MEDICINE

## 2024-10-16 PROCEDURE — 91322 SARSCOV2 VAC 50 MCG/0.5ML IM: CPT | Performed by: FAMILY MEDICINE

## 2024-10-16 PROCEDURE — 3078F DIAST BP <80 MM HG: CPT | Performed by: FAMILY MEDICINE

## 2024-10-16 PROCEDURE — 99214 OFFICE O/P EST MOD 30 MIN: CPT | Performed by: FAMILY MEDICINE

## 2024-10-16 PROCEDURE — 3075F SYST BP GE 130 - 139MM HG: CPT | Performed by: FAMILY MEDICINE

## 2024-10-16 RX ORDER — TAMSULOSIN HYDROCHLORIDE 0.4 MG/1
0.4 CAPSULE ORAL AT BEDTIME
Qty: 90 CAPSULE | Refills: 1 | Status: SHIPPED | OUTPATIENT
Start: 2024-10-16

## 2024-10-16 ASSESSMENT — ENCOUNTER SYMPTOMS
RESPIRATORY NEGATIVE: 1
CONSTITUTIONAL NEGATIVE: 1
CONSTIPATION: 0
PHOTOPHOBIA: 0
DIARRHEA: 0
ABDOMINAL PAIN: 1

## 2024-10-16 ASSESSMENT — PATIENT HEALTH QUESTIONNAIRE - PHQ9
SUM OF ALL RESPONSES TO PHQ9 QUESTIONS 1 AND 2: 0
CLINICAL INTERPRETATION OF PHQ2 SCORE: NO FURTHER SCREENING NEEDED
2. FEELING DOWN, DEPRESSED OR HOPELESS: NOT AT ALL
1. LITTLE INTEREST OR PLEASURE IN DOING THINGS: NOT AT ALL
SUM OF ALL RESPONSES TO PHQ9 QUESTIONS 1 AND 2: 0

## 2024-10-16 ASSESSMENT — PAIN SCALES - GENERAL: PAINLEVEL: 0

## 2025-01-29 DIAGNOSIS — N40.1 BENIGN PROSTATIC HYPERPLASIA WITH LOWER URINARY TRACT SYMPTOMS, SYMPTOM DETAILS UNSPECIFIED: ICD-10-CM

## 2025-01-29 RX ORDER — TAMSULOSIN HYDROCHLORIDE 0.4 MG/1
0.4 CAPSULE ORAL AT BEDTIME
Qty: 90 CAPSULE | Refills: 1 | Status: SHIPPED | OUTPATIENT
Start: 2025-01-29

## 2025-01-31 ENCOUNTER — TELEPHONE (OUTPATIENT)
Dept: FAMILY MEDICINE | Age: 85
End: 2025-01-31

## 2025-01-31 DIAGNOSIS — Z95.0 CARDIAC PACEMAKER: Primary | ICD-10-CM

## 2025-05-07 ENCOUNTER — TELEPHONE (OUTPATIENT)
Dept: NEUROLOGY | Age: 85
End: 2025-05-07

## 2025-07-23 ENCOUNTER — TELEPHONE (OUTPATIENT)
Dept: NEUROLOGY | Age: 85
End: 2025-07-23

## 2025-07-30 ENCOUNTER — APPOINTMENT (OUTPATIENT)
Dept: NEUROLOGY | Age: 85
End: 2025-07-30